# Patient Record
Sex: FEMALE | Race: WHITE | Employment: FULL TIME | URBAN - METROPOLITAN AREA
[De-identification: names, ages, dates, MRNs, and addresses within clinical notes are randomized per-mention and may not be internally consistent; named-entity substitution may affect disease eponyms.]

---

## 2017-02-14 RX ORDER — RISEDRONATE SODIUM 150 MG/1
150 TABLET, FILM COATED ORAL
Qty: 1 TABLET | Refills: 3 | Status: CANCELLED | OUTPATIENT
Start: 2017-02-14

## 2017-02-15 RX ORDER — ALENDRONATE SODIUM 70 MG/1
70 TABLET ORAL
Qty: 12 TABLET | Refills: 3 | Status: SHIPPED | OUTPATIENT
Start: 2017-02-15 | End: 2017-12-30 | Stop reason: SDUPTHER

## 2017-11-06 RX ORDER — SERTRALINE HYDROCHLORIDE 100 MG/1
TABLET, FILM COATED ORAL
Qty: 135 TABLET | Refills: 3 | Status: SHIPPED | OUTPATIENT
Start: 2017-11-06 | End: 2018-06-27 | Stop reason: SDUPTHER

## 2017-11-16 ENCOUNTER — TELEPHONE (OUTPATIENT)
Dept: INTERNAL MEDICINE | Age: 67
End: 2017-11-16

## 2017-11-16 RX ORDER — AMOXICILLIN 500 MG/1
500 CAPSULE ORAL 3 TIMES DAILY
Qty: 21 CAPSULE | Refills: 0 | Status: SHIPPED | OUTPATIENT
Start: 2017-11-16 | End: 2017-11-23

## 2017-11-23 DIAGNOSIS — E78.5 HYPERLIPIDEMIA, UNSPECIFIED HYPERLIPIDEMIA TYPE: ICD-10-CM

## 2017-11-27 RX ORDER — ATORVASTATIN CALCIUM 40 MG/1
TABLET, FILM COATED ORAL
Qty: 90 TABLET | Refills: 0 | Status: SHIPPED | OUTPATIENT
Start: 2017-11-27 | End: 2018-03-27 | Stop reason: SDUPTHER

## 2018-01-02 RX ORDER — ALENDRONATE SODIUM 70 MG/1
TABLET ORAL
Qty: 12 TABLET | Refills: 0 | Status: SHIPPED | OUTPATIENT
Start: 2018-01-02 | End: 2018-03-27 | Stop reason: SDUPTHER

## 2018-01-10 ENCOUNTER — TELEPHONE (OUTPATIENT)
Dept: INTERNAL MEDICINE | Age: 68
End: 2018-01-10

## 2018-01-10 DIAGNOSIS — Z00.00 PHYSICAL EXAM: Primary | ICD-10-CM

## 2018-01-23 ENCOUNTER — TELEPHONE (OUTPATIENT)
Dept: INTERNAL MEDICINE | Age: 68
End: 2018-01-23

## 2018-01-23 RX ORDER — DOXYCYCLINE HYCLATE 100 MG
100 TABLET ORAL 2 TIMES DAILY
Qty: 14 TABLET | Refills: 0 | Status: SHIPPED | OUTPATIENT
Start: 2018-01-23 | End: 2018-01-30

## 2018-01-23 NOTE — TELEPHONE ENCOUNTER
Symptoms: sore throat , coughing a lot , chest and nasal congestion , fatigue x 5 days   What medications have you tried:sudafed and acetaminophen   Aware that Allyson Graves MD arrives @ 1:20 pm today meanwhile message will be sent   Pharmacy:listed   Pt would like a call back to be advised if it is ok to get future labs done due to being sick at the time of this call   Appointment HFNKIDQ:[phone]

## 2018-01-26 ENCOUNTER — TELEPHONE (OUTPATIENT)
Dept: INTERNAL MEDICINE | Age: 68
End: 2018-01-26

## 2018-01-31 ENCOUNTER — OFFICE VISIT (OUTPATIENT)
Dept: INTERNAL MEDICINE | Age: 68
End: 2018-01-31

## 2018-01-31 VITALS
BODY MASS INDEX: 21.49 KG/M2 | WEIGHT: 129 LBS | DIASTOLIC BLOOD PRESSURE: 80 MMHG | SYSTOLIC BLOOD PRESSURE: 110 MMHG | HEIGHT: 65 IN | TEMPERATURE: 98 F

## 2018-01-31 DIAGNOSIS — E55.9 VITAMIN D DEFICIENCY: ICD-10-CM

## 2018-01-31 DIAGNOSIS — M81.0 AGE-RELATED OSTEOPOROSIS WITHOUT CURRENT PATHOLOGICAL FRACTURE: ICD-10-CM

## 2018-01-31 DIAGNOSIS — F32.A DEPRESSION, UNSPECIFIED DEPRESSION TYPE: ICD-10-CM

## 2018-01-31 DIAGNOSIS — Z00.00 WELL ADULT EXAM: Primary | ICD-10-CM

## 2018-01-31 DIAGNOSIS — Z23 NEED FOR INFLUENZA VACCINATION: ICD-10-CM

## 2018-01-31 DIAGNOSIS — L30.9 DERMATITIS: ICD-10-CM

## 2018-01-31 DIAGNOSIS — E78.5 HYPERLIPIDEMIA, UNSPECIFIED HYPERLIPIDEMIA TYPE: ICD-10-CM

## 2018-01-31 PROCEDURE — 90662 IIV NO PRSV INCREASED AG IM: CPT | Performed by: INTERNAL MEDICINE

## 2018-01-31 PROCEDURE — G0008 ADMIN INFLUENZA VIRUS VAC: HCPCS | Performed by: INTERNAL MEDICINE

## 2018-01-31 PROCEDURE — G0402 INITIAL PREVENTIVE EXAM: HCPCS | Performed by: INTERNAL MEDICINE

## 2018-01-31 PROCEDURE — G0403 EKG FOR INITIAL PREVENT EXAM: HCPCS | Performed by: INTERNAL MEDICINE

## 2018-01-31 ASSESSMENT — PATIENT HEALTH QUESTIONNAIRE - PHQ9
SUM OF ALL RESPONSES TO PHQ QUESTIONS 1-9: 0
SUM OF ALL RESPONSES TO PHQ9 QUESTIONS 1 & 2: 0
2. FEELING DOWN, DEPRESSED OR HOPELESS: 0
1. LITTLE INTEREST OR PLEASURE IN DOING THINGS: 0

## 2018-01-31 NOTE — PROGRESS NOTES
pigmentation of the left lower extremity  Lymphatic:  No lymphadenopathy noted   Neurologic:  Alert & oriented x 3, CN 2-12 normal, normal motor function, normal sensory function, no focal deficits noted   Psychiatric:  Speech and behavior appropriate       Vitals: /80   Temp 98 °F (36.7 °C)   Ht 5' 4.5\" (1.638 m)   Wt 129 lb (58.5 kg)   BMI 21.80 kg/m²     Body mass index is 21.8 kg/m². Wt Readings from Last 3 Encounters:   01/31/18 129 lb (58.5 kg)   12/15/16 125 lb (56.7 kg)   09/02/16 128 lb (58.1 kg)         LABS:    CBC:   Lab Results   Component Value Date    WBC 6.3 10/31/2016    HGB 14.1 10/31/2016    HCT 41.9 10/31/2016    MCV 92 10/31/2016     10/31/2016         No results found for: IRON, TIBC, FERRITIN, FOLATE, [id number], PTH                                                          BMP:    Lab Results   Component Value Date     10/31/2016    K 4.6 10/31/2016     10/31/2016    CO2 27 10/31/2016       LFT's:   Lab Results   Component Value Date    ALT 18 10/31/2016    AST 29 10/31/2016    ALKPHOS 96 10/31/2016    BILITOT 0.3 10/31/2016       Lipids:   Lab Results   Component Value Date    CHOL 231 (H) 10/31/2016    HDL 64 10/31/2016    LDLCALC 137 (H) 10/31/2016    TRIG 152 (H) 10/31/2016       INR: No results found for: INR, PROTIME    U/A:  Lab Results   Component Value Date    LABMICR See below: 10/31/2016        No results found for: LABA1C     Lab Results   Component Value Date    CREATININE 0.74 10/31/2016       -----------------------------------------------------------------     Assessment/Plan:   1. Well adult exam  Check screening labs flu shot given she is up-to-date on all her immunizations otherwise continue diet and exercise  - EKG 12 lead  - CBC Auto Differential  - Comprehensive Metabolic Panel  - Lipid Panel  - TSH without Reflex  - Urinalysis  - Vitamin D 25 Hydroxy    2.  Hyperlipidemia, unspecified hyperlipidemia type  Problem is stable check above labs continue present meds  - EKG 12 lead  - CBC Auto Differential  - Comprehensive Metabolic Panel  - Lipid Panel  - TSH without Reflex  - Urinalysis  - Vitamin D 25 Hydroxy    3. Age-related osteoporosis without current pathological fracture  Problem is stable to get a repeat DEXA scan in 1 year    4. Depression, unspecified depression type  Problem is stable on t current meds    5. Vitamin D deficiency  Check above labs  - Vitamin D 25 Hydroxy    6. Dermatitis  Lidex cream if this is not helpful get a dermatology consult for biopsy to rule out squamous cell carcinoma  - fluocinonide (LIDEX) 0.05 % cream; Apply topically 2 times daily. Dispense: 1 Tube; Refill: 1    7.  Need for influenza vaccination  Shot given  - INFLUENZA, HIGH DOSE, 65 YRS +, IM, PF, PREFILL SYR, 0.5ML (FLUZONE HD)

## 2018-02-21 LAB
A/G RATIO: 1.9 (CALC) (ref 1–2.5)
ALBUMIN SERPL-MCNC: 4.5 G/DL (ref 3.6–5.1)
ALP BLD-CCNC: 70 U/L (ref 33–130)
ALT SERPL-CCNC: 22 U/L (ref 6–29)
AMORPHOUS: ABNORMAL /HPF
AST SERPL-CCNC: 24 U/L (ref 10–35)
ATYPICAL LYMPHOCYTE RELATIVE PERCENT: NORMAL % (ref 0–10)
BACTERIA: ABNORMAL /HPF
BAND NEUTROPHILS: NORMAL %
BANDED NEUTROPHILS ABSOLUTE COUNT: NORMAL CELLS/UL (ref 0–750)
BASOPHILS ABSOLUTE: 41 CELLS/UL (ref 0–200)
BASOPHILS RELATIVE PERCENT: 0.7 %
BILIRUB SERPL-MCNC: 0.4 MG/DL (ref 0.2–1.2)
BILIRUBIN URINE: NEGATIVE
BLASTS ABSOLUTE COUNT: NORMAL CELLS/UL
BLASTS RELATIVE PERCENT: NORMAL %
BLOOD, URINE: NEGATIVE
BUN / CREAT RATIO: NORMAL (CALC) (ref 6–22)
BUN BLDV-MCNC: 22 MG/DL (ref 7–25)
CALCIUM OXALATE CRYSTALS: ABNORMAL /HPF
CALCIUM SERPL-MCNC: 9.8 MG/DL (ref 8.6–10.4)
CASTS: ABNORMAL /LPF
CHLORIDE BLD-SCNC: 104 MMOL/L (ref 98–110)
CHOLESTEROL, TOTAL: 172 MG/DL
CHOLESTEROL/HDL RATIO: 3.1 (CALC)
CHOLESTEROL: 116 MG/DL (CALC)
CLARITY: CLEAR
CO2: 28 MMOL/L (ref 20–31)
COLOR: YELLOW
COMMENT: ABNORMAL
COMMENT: NORMAL
CREAT SERPL-MCNC: 0.79 MG/DL (ref 0.5–0.99)
CRYSTALS: ABNORMAL /HPF
EOSINOPHILS ABSOLUTE: 191 CELLS/UL (ref 15–500)
EOSINOPHILS RELATIVE PERCENT: 3.3 %
EPITHELIAL CELLS, UA: ABNORMAL /HPF
GFR AFRICAN AMERICAN: 90 ML/MIN/1.73M2
GFR SERPL CREATININE-BSD FRML MDRD: 78 ML/MIN/1.73M2
GLOBULIN: 2.4 G/DL (CALC) (ref 1.9–3.7)
GLUCOSE BLD-MCNC: 87 MG/DL (ref 65–99)
GLUCOSE URINE: NEGATIVE
GRANULAR CASTS: ABNORMAL /LPF
HCT VFR BLD CALC: 42.5 % (ref 35–45)
HDLC SERPL-MCNC: 56 MG/DL
HEMOGLOBIN: 13.8 G/DL (ref 11.7–15.5)
HYALINE CASTS: ABNORMAL /LPF
KETONES, URINE: NEGATIVE
LDL CHOLESTEROL CALCULATED: 93 MG/DL (CALC)
LEUKOCYTE ESTERASE, URINE: ABNORMAL
LEUKOCYTES, UA: ABNORMAL /HPF
LYMPHOCYTES ABSOLUTE: 2123 CELLS/UL (ref 850–3900)
LYMPHOCYTES RELATIVE PERCENT: 36.6 %
MCH RBC QN AUTO: 30.7 PG (ref 27–33)
MCHC RBC AUTO-ENTMCNC: 32.5 G/DL (ref 32–36)
MCV RBC AUTO: 94.7 FL (ref 80–100)
METAMYELOCYTES ABSOLUTE COUNT: NORMAL CELLS/UL
METAMYELOCYTES RELATIVE PERCENT: NORMAL %
MONOCYTES ABSOLUTE: 458 CELLS/UL (ref 200–950)
MONOCYTES RELATIVE PERCENT: 7.9 %
MYELOCYTE PERCENT: NORMAL %
MYELOCYTES ABSOLUTE COUNT: NORMAL CELLS/UL
NEUTROPHILS ABSOLUTE: 2987 CELLS/UL (ref 1500–7800)
NITRITE, URINE: NEGATIVE
NUCLEATED RED BLOOD CELLS: NORMAL /100 WBC
NUCLEATED RED BLOOD CELLS: NORMAL CELLS/UL
PDW BLD-RTO: 13 % (ref 11–15)
PH UA: 6 (ref 5–8)
PLATELET # BLD: 212 THOUSAND/UL (ref 140–400)
PMV BLD AUTO: 9.7 FL (ref 7.5–12.5)
POTASSIUM SERPL-SCNC: 4.9 MMOL/L (ref 3.5–5.3)
PROMYELOCYTES ABSOLUTE COUNT: NORMAL CELLS/UL
PROMYELOCYTES PERCENT: NORMAL %
PROTEIN UA: NEGATIVE
RBC # BLD: 4.49 MILLION/UL (ref 3.8–5.1)
RBC UA: ABNORMAL /HPF
REDUCING SUBSTANCES, URINE: ABNORMAL %
RENAL EPITHELIAL, POC: ABNORMAL /HPF
SEGMENTED NEUTROPHILS RELATIVE PERCENT: 51.5 %
SODIUM BLD-SCNC: 141 MMOL/L (ref 135–146)
SPECIFIC GRAVITY UA: 1.01 (ref 1–1.03)
TOTAL PROTEIN: 6.9 G/DL (ref 6.1–8.1)
TRANSITIONAL EPITHELIAL: ABNORMAL /HPF
TRIGL SERPL-MCNC: 129 MG/DL
TRIPLE PHOSPHATE CRYSTALS: ABNORMAL /HPF
TSH ULTRASENSITIVE: 3.81 MIU/L (ref 0.4–4.5)
URATE CRYSTALS, URINE: ABNORMAL /HPF
VITAMIN D 25-HYDROXY: 40 NG/ML (ref 30–100)
WBC # BLD: 5.8 THOUSAND/UL (ref 3.8–10.8)
YEAST: ABNORMAL /HPF

## 2018-03-02 ENCOUNTER — TELEPHONE (OUTPATIENT)
Dept: INTERNAL MEDICINE | Age: 68
End: 2018-03-02

## 2018-03-02 NOTE — TELEPHONE ENCOUNTER
Pt is following up on lab work done on 2/20/18. Pt is aware that Dr. Alvin Alaniz is out of office.   Please call

## 2018-03-27 ENCOUNTER — TELEPHONE (OUTPATIENT)
Dept: INTERNAL MEDICINE | Age: 68
End: 2018-03-27

## 2018-03-27 DIAGNOSIS — E78.5 HYPERLIPIDEMIA, UNSPECIFIED HYPERLIPIDEMIA TYPE: ICD-10-CM

## 2018-03-27 RX ORDER — ALENDRONATE SODIUM 70 MG/1
TABLET ORAL
Qty: 12 TABLET | Refills: 3 | Status: SHIPPED | OUTPATIENT
Start: 2018-03-27 | End: 2019-02-16 | Stop reason: SDUPTHER

## 2018-03-27 RX ORDER — ATORVASTATIN CALCIUM 40 MG/1
TABLET, FILM COATED ORAL
Qty: 90 TABLET | Refills: 3 | Status: SHIPPED | OUTPATIENT
Start: 2018-03-27 | End: 2019-03-26 | Stop reason: SDUPTHER

## 2018-06-27 ENCOUNTER — TELEPHONE (OUTPATIENT)
Dept: INTERNAL MEDICINE | Age: 68
End: 2018-06-27

## 2018-06-27 RX ORDER — SERTRALINE HYDROCHLORIDE 100 MG/1
TABLET, FILM COATED ORAL
Qty: 135 TABLET | Refills: 3 | Status: SHIPPED | OUTPATIENT
Start: 2018-06-27 | End: 2019-05-29 | Stop reason: SDUPTHER

## 2019-02-18 RX ORDER — ALENDRONATE SODIUM 70 MG/1
TABLET ORAL
Qty: 12 TABLET | Refills: 0 | Status: SHIPPED | OUTPATIENT
Start: 2019-02-18 | End: 2019-05-20 | Stop reason: SDUPTHER

## 2019-03-26 ENCOUNTER — TELEPHONE (OUTPATIENT)
Dept: INTERNAL MEDICINE CLINIC | Age: 69
End: 2019-03-26

## 2019-03-26 DIAGNOSIS — E78.5 HYPERLIPIDEMIA, UNSPECIFIED HYPERLIPIDEMIA TYPE: ICD-10-CM

## 2019-03-26 RX ORDER — ATORVASTATIN CALCIUM 40 MG/1
TABLET, FILM COATED ORAL
Qty: 90 TABLET | Refills: 3 | Status: SHIPPED | OUTPATIENT
Start: 2019-03-26 | End: 2019-05-29 | Stop reason: SDUPTHER

## 2019-05-08 ENCOUNTER — TELEPHONE (OUTPATIENT)
Dept: INTERNAL MEDICINE CLINIC | Age: 69
End: 2019-05-08

## 2019-05-08 DIAGNOSIS — E78.2 MIXED HYPERLIPIDEMIA: Primary | ICD-10-CM

## 2019-05-08 DIAGNOSIS — Z00.00 PHYSICAL EXAM, ANNUAL: ICD-10-CM

## 2019-05-20 RX ORDER — ALENDRONATE SODIUM 70 MG/1
TABLET ORAL
Qty: 12 TABLET | Refills: 0 | Status: SHIPPED | OUTPATIENT
Start: 2019-05-20 | End: 2019-05-29 | Stop reason: SDUPTHER

## 2019-05-29 ENCOUNTER — OFFICE VISIT (OUTPATIENT)
Dept: INTERNAL MEDICINE CLINIC | Age: 69
End: 2019-05-29
Payer: MEDICARE

## 2019-05-29 VITALS
DIASTOLIC BLOOD PRESSURE: 68 MMHG | WEIGHT: 123 LBS | BODY MASS INDEX: 21 KG/M2 | SYSTOLIC BLOOD PRESSURE: 122 MMHG | HEIGHT: 64 IN

## 2019-05-29 DIAGNOSIS — Z00.00 ANNUAL PHYSICAL EXAM: ICD-10-CM

## 2019-05-29 DIAGNOSIS — E78.5 HYPERLIPIDEMIA, UNSPECIFIED HYPERLIPIDEMIA TYPE: ICD-10-CM

## 2019-05-29 DIAGNOSIS — M81.0 OSTEOPOROSIS, UNSPECIFIED OSTEOPOROSIS TYPE, UNSPECIFIED PATHOLOGICAL FRACTURE PRESENCE: ICD-10-CM

## 2019-05-29 DIAGNOSIS — Z12.11 SCREENING FOR COLON CANCER: ICD-10-CM

## 2019-05-29 DIAGNOSIS — Z23 NEED FOR 23-POLYVALENT PNEUMOCOCCAL POLYSACCHARIDE VACCINE: Primary | ICD-10-CM

## 2019-05-29 PROCEDURE — 90732 PPSV23 VACC 2 YRS+ SUBQ/IM: CPT | Performed by: INTERNAL MEDICINE

## 2019-05-29 PROCEDURE — 3017F COLORECTAL CA SCREEN DOC REV: CPT | Performed by: INTERNAL MEDICINE

## 2019-05-29 PROCEDURE — G0009 ADMIN PNEUMOCOCCAL VACCINE: HCPCS | Performed by: INTERNAL MEDICINE

## 2019-05-29 PROCEDURE — G0439 PPPS, SUBSEQ VISIT: HCPCS | Performed by: INTERNAL MEDICINE

## 2019-05-29 PROCEDURE — 4040F PNEUMOC VAC/ADMIN/RCVD: CPT | Performed by: INTERNAL MEDICINE

## 2019-05-29 PROCEDURE — 93000 ELECTROCARDIOGRAM COMPLETE: CPT | Performed by: INTERNAL MEDICINE

## 2019-05-29 PROCEDURE — 1123F ACP DISCUSS/DSCN MKR DOCD: CPT | Performed by: INTERNAL MEDICINE

## 2019-05-29 RX ORDER — SERTRALINE HYDROCHLORIDE 100 MG/1
TABLET, FILM COATED ORAL
Qty: 135 TABLET | Refills: 3 | Status: SHIPPED | OUTPATIENT
Start: 2019-05-29 | End: 2020-05-14

## 2019-05-29 RX ORDER — ATORVASTATIN CALCIUM 40 MG/1
TABLET, FILM COATED ORAL
Qty: 90 TABLET | Refills: 3 | Status: SHIPPED | OUTPATIENT
Start: 2019-05-29 | End: 2020-09-02

## 2019-05-29 RX ORDER — ALENDRONATE SODIUM 70 MG/1
TABLET ORAL
Qty: 12 TABLET | Refills: 3 | Status: SHIPPED | OUTPATIENT
Start: 2019-05-29 | End: 2020-07-27

## 2019-05-29 ASSESSMENT — LIFESTYLE VARIABLES
HOW OFTEN DURING THE LAST YEAR HAVE YOU HAD A FEELING OF GUILT OR REMORSE AFTER DRINKING: 0
AUDIT-C TOTAL SCORE: 1
HOW OFTEN DO YOU HAVE A DRINK CONTAINING ALCOHOL: 1
HAVE YOU OR SOMEONE ELSE BEEN INJURED AS A RESULT OF YOUR DRINKING: 0
HOW OFTEN DO YOU HAVE SIX OR MORE DRINKS ON ONE OCCASION: 0
HOW OFTEN DURING THE LAST YEAR HAVE YOU NEEDED AN ALCOHOLIC DRINK FIRST THING IN THE MORNING TO GET YOURSELF GOING AFTER A NIGHT OF HEAVY DRINKING: 0
HOW OFTEN DURING THE LAST YEAR HAVE YOU BEEN UNABLE TO REMEMBER WHAT HAPPENED THE NIGHT BEFORE BECAUSE YOU HAD BEEN DRINKING: 0
HOW OFTEN DURING THE LAST YEAR HAVE YOU FOUND THAT YOU WERE NOT ABLE TO STOP DRINKING ONCE YOU HAD STARTED: 0
AUDIT TOTAL SCORE: 1
HOW MANY STANDARD DRINKS CONTAINING ALCOHOL DO YOU HAVE ON A TYPICAL DAY: 0
HAS A RELATIVE, FRIEND, DOCTOR, OR ANOTHER HEALTH PROFESSIONAL EXPRESSED CONCERN ABOUT YOUR DRINKING OR SUGGESTED YOU CUT DOWN: 0
HOW OFTEN DURING THE LAST YEAR HAVE YOU FAILED TO DO WHAT WAS NORMALLY EXPECTED FROM YOU BECAUSE OF DRINKING: 0

## 2019-05-29 ASSESSMENT — PATIENT HEALTH QUESTIONNAIRE - PHQ9
SUM OF ALL RESPONSES TO PHQ QUESTIONS 1-9: 0
SUM OF ALL RESPONSES TO PHQ QUESTIONS 1-9: 0

## 2019-05-29 ASSESSMENT — ANXIETY QUESTIONNAIRES: GAD7 TOTAL SCORE: 0

## 2019-05-29 NOTE — PROGRESS NOTES
Medicare Annual Wellness Visit  Name: Kar Mo Date: 2019   MRN: [id number] Sex: Female   Age: 80 y.o. Ethnicity: Non-/Non    : 1950 Race: Alberto Kaufman is here for Medicare AWV    Screenings for behavioral, psychosocial and functional/safety risks, and cognitive dysfunction are all negative except as indicated below. These results, as well as other patient data from the [address] form, are documented in Flowsheets linked to this Encounter. Allergies   Allergen Reactions    Sulfa Antibiotics Rash     Patient developed a rash and muscle pain. Prior to Visit Medications    Medication Sig Taking? Authorizing Provider   alendronate (FOSAMAX) 70 MG tablet TAKE 1 TABLET EVERY 7 DAYS Yes Bernard Velez MD   atorvastatin (LIPITOR) 40 MG tablet TAKE 1 TABLET DAILY Yes Bernard Velez MD   sertraline (ZOLOFT) 100 MG tablet TAKE ONE AND ONE-HALF TABLETS DAILY Yes Bernard Velez MD   fluocinonide (LIDEX) 0.05 % cream Apply topically 2 times daily. Yes Bernard Velez MD   cycloSPORINE (RESTASIS) 0.05 % ophthalmic emulsion 1 drop 2 times daily Yes Historical Provider, MD   Tretinoin (RETIN-A EX) Apply  topically. Yes Historical Provider, MD     Past Medical History:   Diagnosis Date    Age-related osteoporosis without current pathological fracture 2018    Depression     Hyperlipidemia      No past surgical history on file.   Family History   Problem Relation Age of Onset    Alzheimer's Disease Mother     Heart Disease Mother     Depression Mother     Cancer Father        CareTeam (Including outside providers/suppliers regularly involved in providing care):   Patient Care Team:  Bernard Velez MD as PCP - General (Internal Medicine)    Wt Readings from Last 3 Encounters:   19 123 lb (55.8 kg)   18 129 lb (58.5 kg)   12/15/16 125 lb (56.7 kg)     Vitals:    19 1524   BP: 122/68   Site: Left Upper Arm   Weight: 123 lb (55.8 kg)   Height: 5' 4\" (1.626 m)     Body mass index is 21.11 kg/m². Based upon direct observation of the patient, evaluation of cognition reveals recent and remote memory intact. General Appearance: alert and oriented to person, place and time, well developed and well- nourished, in no acute distress  Skin: warm and dry, no rash or erythema  Head: normocephalic and atraumatic  Eyes: pupils equal, round, and reactive to light, extraocular eye movements intact, conjunctivae normal  ENT: tympanic membrane, external ear and ear canal normal bilaterally, nose without deformity, nasal mucosa and turbinates normal without polyps  Neck: supple and non-tender without mass, no thyromegaly or thyroid nodules, no cervical lymphadenopathy  Pulmonary/Chest: clear to auscultation bilaterally- no wheezes, rales or rhonchi, normal air movement, no respiratory distress  Cardiovascular: normal rate, regular rhythm, normal S1 and S2, no murmurs, rubs, clicks, or gallops, distal pulses intact, no carotid bruits  Abdomen: soft, non-tender, non-distended, normal bowel sounds, no masses or organomegaly  Extremities: no cyanosis, clubbing or edema  Musculoskeletal: normal range of motion, no joint swelling, deformity or tenderness  Neurologic: reflexes normal and symmetric, no cranial nerve deficit, gait, coordination and speech normal    Patient's complete Health Risk Assessment and screening values have been reviewed and are found in Flowsheets. The following problems were reviewed today and where indicated follow up appointments were made and/or referrals ordered. Positive Risk Factor Screenings with Interventions:     General Health:  General  In general, how would you say your health is?: Very Good  In the past 7 days, have you experienced any of the following?  New or Increased Pain, New or Increased Fatigue, Loneliness, Social Isolation, Stress or Anger?: None of These  Do you get the social and emotional support that you need?: Yes  Do you have a Living Will?: (!) No  General Health Risk Interventions:  · Poor self-assessment of health status: patient declines any further evaluation/treatment for this issue    Personalized Preventive Plan   Current Health Maintenance Status  Immunization History   Administered Date(s) Administered    Influenza Vaccine, unspecified formulation 10/30/2017    Influenza, High Dose (Fluzone 65 yrs and older) 12/15/2016, 2018    Influenza, Intradermal, Preservative free 11/15/2013    Pneumococcal 13-valent Conjugate (Xpfeonn30) 12/15/2016    Tdap (Boostrix, Adacel) 2009    Zoster Live (Zostavax) 2015        Health Maintenance   Topic Date Due    Hepatitis C screen  1950    Shingles Vaccine (2 of 3) 2015    Pneumococcal 65+ years Vaccine (2 of 2 - PPSV23) 12/15/2017    Colon cancer screen colonoscopy  2018    DTaP/Tdap/Td vaccine (2 - Td) 2019    Flu vaccine (Season Ended) 2019    Breast cancer screen  2020    Lipid screen  2024    DEXA (modify frequency per FRAX score)  Completed     Recommendations for Preventive Services Due: see orders and patient instructions/AVS.  . Recommended screening schedule for the next 5-10 years is provided to the patient in written form: see Patient Instructions/AVS.        Annual Wellness Visit     Patient:  Cori Jack                                               : 1950  Age: 80 y.o. MRN: [id number]  Date : 2019      CHIEF COMPLAINT: Cori Jack is a 80 y.o. female who presents for : Physical exam    1. Screening for colon cancer  Date colonoscopy next year  - COLONOSCOPY (Screening); Future    2. Hyperlipidemia, unspecified hyperlipidemia type  No complaints no myalgias  - EKG 12 Lead  - atorvastatin (LIPITOR) 40 MG tablet; TAKE 1 TABLET DAILY  Dispense: 90 tablet; Refill: 3    3.  Osteoporosis, unspecified osteoporosis type, unspecified pathological fracture presence  To get a repeat DEXA scan continue on Fosamax  - DEXA BONE DENSITY 2 SITES; Future  Physical exam Gen. he feels okay denies any chest pain shortness of breath or any other problems      Patient Active Problem List    Diagnosis Date Noted    Age-related osteoporosis without current pathological fracture 01/31/2018    Stress at home 09/16/2016    Depression 05/19/2011    Hyperlipidemia 05/19/2011    Vitamin D deficiency 05/19/2011       Constitutional:  Denies fever or chills   Eyes:  Denies change in visual acuity   HENT:  Denies nasal congestion or sore throat   Respiratory:  Denies cough or shortness of breath   Cardiovascular:  Denies chest pain or edema   GI:  Denies abdominal pain, nausea, vomiting, bloody stools or diarrhea   :  Denies dysuria   Musculoskeletal:  Denies back pain or joint pain   Integument:  Denies rash   Neurologic:  Denies headache, focal weakness or sensory changes   Endocrine:  Denies polyuria or polydipsia   Lymphatic:  Denies swollen glands   Psychiatric:  Denies depression or anxiety     Past Medical History:        Diagnosis Date    Age-related osteoporosis without current pathological fracture 1/31/2018    Depression     Hyperlipidemia        Past Surgical History:    No past surgical history on file.     Family History:  Family History   Problem Relation Age of Onset    Alzheimer's Disease Mother     Heart Disease Mother     Depression Mother     Cancer Father        Social History:  Social History     Socioeconomic History    Marital status:      Spouse name: None    Number of children: None    Years of education: None    Highest education level: None   Occupational History    None   Social Needs    Financial resource strain: None    Food insecurity:     Worry: None     Inability: None    Transportation needs:     Medical: None     Non-medical: None   Tobacco Use    Smoking status: Never Smoker    Smokeless tobacco: Never Used   Substance and Sexual Activity    Alcohol use: No    Drug use: None    Sexual activity: None   Lifestyle    Physical activity:     Days per week: None     Minutes per session: None    Stress: None   Relationships    Social connections:     Talks on phone: None     Gets together: None     Attends Jew service: None     Active member of club or organization: None     Attends meetings of clubs or organizations: None     Relationship status: None    Intimate partner violence:     Fear of current or ex partner: None     Emotionally abused: None     Physically abused: None     Forced sexual activity: None   Other Topics Concern    None   Social History Narrative    None         Allergies:  Sulfa antibiotics    Current Medications:    Prior to Admission medications    Medication Sig Start Date End Date Taking? Authorizing Provider   alendronate (FOSAMAX) 70 MG tablet TAKE 1 TABLET EVERY 7 DAYS 5/29/19  Yes Jewell Oneill MD   atorvastatin (LIPITOR) 40 MG tablet TAKE 1 TABLET DAILY 5/29/19  Yes Jewell Oneill MD   sertraline (ZOLOFT) 100 MG tablet TAKE ONE AND ONE-HALF TABLETS DAILY 5/29/19  Yes Jewell Oneill MD   fluocinonide (LIDEX) 0.05 % cream Apply topically 2 times daily. 1/31/18  Yes Jewell Oneill MD   cycloSPORINE (RESTASIS) 0.05 % ophthalmic emulsion 1 drop 2 times daily   Yes Historical Provider, MD   Tretinoin (RETIN-A EX) Apply  topically. Yes Historical Provider, MD           Physical Exam:      Constitutional:  Well developed, well nourished, no acute distress, non-toxic appearance   Eyes:  PERRL, conjunctiva normal   HENT:  Atraumatic, external ears normal, nose normal, oropharynx moist, no pharyngeal exudates.  Neck- normal range of motion, no tenderness, supple   Respiratory:  No respiratory distress, normal breath sounds, no rales, no wheezing   Cardiovascular:  Normal rate, normal rhythm, no murmurs, no gallops, no rubs   GI:  Soft, nondistended, normal bowel sounds, nontender, no organomegaly, no mass, no rebound, no guarding   :  No costovertebral angle tenderness   Musculoskeletal:  No edema, no tenderness, no deformities. Back- no tenderness  Integument:  Well hydrated, no rash   Lymphatic:  No lymphadenopathy noted   Neurologic:  Alert & oriented x 3, CN 2-12 normal, normal motor function, normal sensory function, no focal deficits noted   Psychiatric:  Speech and behavior appropriate       Vitals: /68 (Site: Left Upper Arm)   Ht 5' 4\" (1.626 m)   Wt 123 lb (55.8 kg)   BMI 21.11 kg/m²     Body mass index is 21.11 kg/m². Wt Readings from Last 3 Encounters:   05/29/19 123 lb (55.8 kg)   01/31/18 129 lb (58.5 kg)   12/15/16 125 lb (56.7 kg)         LABS:    CBC:   Lab Results   Component Value Date    WBC 4.7 05/21/2019    HGB 13.7 05/21/2019    HCT 41.4 05/21/2019    MCV 94.1 05/21/2019     05/21/2019         No results found for: IRON, TIBC, FERRITIN, FOLATE, [id number], PTH                                                          BMP:    Lab Results   Component Value Date     05/21/2019    K 4.8 05/21/2019     05/21/2019    CO2 31 05/21/2019       LFT's:   Lab Results   Component Value Date    ALT 16 05/21/2019    AST 25 05/21/2019    ALKPHOS 61 05/21/2019    BILITOT 0.4 05/21/2019       Lipids:   Lab Results   Component Value Date    CHOL 164 05/21/2019    CHOL 112 05/21/2019    HDL 52 05/21/2019    LDLCALC 89 05/21/2019    TRIG 132 05/21/2019       INR: No results found for: INR, PROTIME    U/A:  Lab Results   Component Value Date    LABMICR See below: 10/31/2016        No results found for: LABA1C     Lab Results   Component Value Date    CREATININE 0.82 05/21/2019       -----------------------------------------------------------------     Assessment/Plan:   1. Screening for colon cancer  To get colonoscopy in one year  - COLONOSCOPY (Screening); Future    2.  Hyperlipidemia, unspecified hyperlipidemia type  His problem is stable check above labs continue present meds  - EKG 12 Lead  - atorvastatin (LIPITOR) 40

## 2019-07-09 ENCOUNTER — TELEPHONE (OUTPATIENT)
Dept: INTERNAL MEDICINE CLINIC | Age: 69
End: 2019-07-09

## 2020-05-14 RX ORDER — SERTRALINE HYDROCHLORIDE 100 MG/1
TABLET, FILM COATED ORAL
Qty: 135 TABLET | Refills: 3 | Status: SHIPPED | OUTPATIENT
Start: 2020-05-14 | End: 2021-05-17

## 2020-07-22 ENCOUNTER — TELEPHONE (OUTPATIENT)
Dept: INTERNAL MEDICINE CLINIC | Age: 70
End: 2020-07-22

## 2020-07-27 RX ORDER — ALENDRONATE SODIUM 70 MG/1
TABLET ORAL
Qty: 12 TABLET | Refills: 0 | Status: SHIPPED | OUTPATIENT
Start: 2020-07-27 | End: 2020-10-08

## 2020-08-17 RX ORDER — ATORVASTATIN CALCIUM 40 MG/1
TABLET, FILM COATED ORAL
Qty: 90 TABLET | Refills: 3 | OUTPATIENT
Start: 2020-08-17

## 2020-09-02 RX ORDER — ATORVASTATIN CALCIUM 40 MG/1
TABLET, FILM COATED ORAL
Qty: 30 TABLET | Refills: 0 | Status: SHIPPED | OUTPATIENT
Start: 2020-09-02 | End: 2020-09-23

## 2020-09-10 ENCOUNTER — OFFICE VISIT (OUTPATIENT)
Dept: INTERNAL MEDICINE CLINIC | Age: 70
End: 2020-09-10
Payer: MEDICARE

## 2020-09-10 VITALS — TEMPERATURE: 98 F | BODY MASS INDEX: 21 KG/M2 | WEIGHT: 123 LBS | HEIGHT: 64 IN

## 2020-09-10 PROBLEM — D12.6 ADENOMATOUS POLYP OF COLON: Status: ACTIVE | Noted: 2020-09-10

## 2020-09-10 PROCEDURE — 3017F COLORECTAL CA SCREEN DOC REV: CPT | Performed by: INTERNAL MEDICINE

## 2020-09-10 PROCEDURE — 1123F ACP DISCUSS/DSCN MKR DOCD: CPT | Performed by: INTERNAL MEDICINE

## 2020-09-10 PROCEDURE — 90694 VACC AIIV4 NO PRSRV 0.5ML IM: CPT | Performed by: INTERNAL MEDICINE

## 2020-09-10 PROCEDURE — 93000 ELECTROCARDIOGRAM COMPLETE: CPT | Performed by: INTERNAL MEDICINE

## 2020-09-10 PROCEDURE — G0008 ADMIN INFLUENZA VIRUS VAC: HCPCS | Performed by: INTERNAL MEDICINE

## 2020-09-10 PROCEDURE — 4040F PNEUMOC VAC/ADMIN/RCVD: CPT | Performed by: INTERNAL MEDICINE

## 2020-09-10 PROCEDURE — G0438 PPPS, INITIAL VISIT: HCPCS | Performed by: INTERNAL MEDICINE

## 2020-09-10 ASSESSMENT — PATIENT HEALTH QUESTIONNAIRE - PHQ9
SUM OF ALL RESPONSES TO PHQ9 QUESTIONS 1 & 2: 1
SUM OF ALL RESPONSES TO PHQ QUESTIONS 1-9: 1
SUM OF ALL RESPONSES TO PHQ QUESTIONS 1-9: 1
1. LITTLE INTEREST OR PLEASURE IN DOING THINGS: 1
2. FEELING DOWN, DEPRESSED OR HOPELESS: 0

## 2020-09-10 ASSESSMENT — LIFESTYLE VARIABLES: HOW OFTEN DO YOU HAVE A DRINK CONTAINING ALCOHOL: 0

## 2020-09-10 NOTE — PROGRESS NOTES
Annual Wellness Visit     Patient:  Kameron Berry                                               : 1950  Age: 77 y.o. MRN: <[id number]>  Date : 9/10/2020      CHIEF COMPLAINT: Kameron Berry is a 77 y.o. female who presents for : Physical exam    1. Wellness examination  Generally feels good denies any chest pain shortness of breath or any other problems  - EKG 12 Lead  - External Referral To Gastroenterology    2. Hyperlipidemia, unspecified hyperlipidemia type  No complaints no myalgias  - EKG 12 Lead    3. Adenomatous polyp of colon, unspecified part of colon  Has a history of colon polyps is due for colonoscopy    4. Osteoporosis, unspecified osteoporosis type, unspecified pathological fracture presence  Been on Fosamax for 3 years  - VITAMIN D 25 HYDROXY; Future    5.  Need for influenza vaccination    - INFLUENZA, QUADV, ADJUVANTED, 72 YRS =, IM, PF, PREFILL SYR, 0.5ML (FLUAD)        Patient Active Problem List    Diagnosis Date Noted    Adenomatous polyp of colon 09/10/2020    Age-related osteoporosis without current pathological fracture 2018    Stress at home 2016    Depression 2011    Hyperlipidemia 2011    Vitamin D deficiency 2011       Constitutional:  Denies fever or chills   Eyes:  Denies change in visual acuity   HENT:  Denies nasal congestion or sore throat   Respiratory:  Denies cough or shortness of breath   Cardiovascular:  Denies chest pain or edema   GI:  Denies abdominal pain, nausea, vomiting, bloody stools or diarrhea   :  Denies dysuria   Musculoskeletal:  Denies back pain or joint pain   Integument:  Denies rash   Neurologic:  Denies headache, focal weakness or sensory changes   Endocrine:  Denies polyuria or polydipsia   Lymphatic:  Denies swollen glands   Psychiatric:  Denies depression or anxiety     Past Medical History:        Diagnosis Date    Adenomatous polyp of colon 9/10/2020    Age-related osteoporosis without current Component Value Date    CHOL 213 (H) 09/05/2020    HDL 67 09/05/2020    LDLCALC 121 (H) 09/05/2020    TRIG 146 09/05/2020       INR: No results found for: INR, PROTIME    U/A:  Lab Results   Component Value Date    LABMICR See below: 09/05/2020    LABMICR CANCELED 09/05/2020        No results found for: LABA1C     Lab Results   Component Value Date    CREATININE 0.77 09/05/2020       -----------------------------------------------------------------     Assessment/Plan:   1. Wellness examination  Check screening labs flu shot given continue diet and exercise  - EKG 12 Lead  - External Referral To Gastroenterology    2. Hyperlipidemia, unspecified hyperlipidemia type  This problem is stable check above labs continue present meds  - EKG 12 Lead    3. Adenomatous polyp of colon, unspecified part of colon  Referral to \"GI for colonoscopy    4. Osteoporosis, unspecified osteoporosis type, unspecified pathological fracture presence  Check vitamin D level continue present meds will reassess after 5 years  - VITAMIN D 25 HYDROXY; Future    5.  Need for influenza vaccination  Flu shot given  - INFLUENZA, QUADV, ADJUVANTED, 65 YRS =, IM, PF, PREFILL SYR, 0.5ML (FLUAD)

## 2020-09-10 NOTE — PROGRESS NOTES
Medicare Annual Wellness Visit  Name: Wendy Adjutant Date: 9/10/2020   MRN: <[id number]> Sex: Female   Age: 77 y.o. Ethnicity: Non-/Non    : 1950 Race: Olamide Cruz is here for Medicare AWV    Screenings for behavioral, psychosocial and functional/safety risks, and cognitive dysfunction are all negative except as indicated below. These results, as well as other patient data from the [address] [facility] form, are documented in Flowsheets linked to this Encounter. Allergies   Allergen Reactions    Sulfa Antibiotics Rash     Patient developed a rash and muscle pain. Prior to Visit Medications    Medication Sig Taking? Authorizing Provider   atorvastatin (LIPITOR) 40 MG tablet TAKE 1 TABLET DAILY Yes Cristobal Mcfarland MD   alendronate (FOSAMAX) 70 MG tablet TAKE 1 TABLET EVERY 7 DAYS Yes Cristobal Mcfarland MD   sertraline (ZOLOFT) 100 MG tablet TAKE 1 AND 1/2 TABLETS     DAILY Yes Cristobal Mcfarland MD   cycloSPORINE (RESTASIS) 0.05 % ophthalmic emulsion 1 drop 2 times daily Yes Historical Provider, MD   Tretinoin (RETIN-A EX) Apply  topically. Yes Historical Provider, MD       Past Medical History:   Diagnosis Date    Adenomatous polyp of colon 9/10/2020    Age-related osteoporosis without current pathological fracture 2018    Depression     Hyperlipidemia        No past surgical history on file.     Family History   Problem Relation Age of Onset    Alzheimer's Disease Mother     Heart Disease Mother     Depression Mother     Cancer Father        CareTeam (Including outside providers/suppliers regularly involved in providing care):   Patient Care Team:  Cristobal Mcfarland MD as PCP - General (Internal Medicine)  Cristobal Mcfarland MD as PCP - REHABILITATION [facility] Empaneled Provider    Wt Readings from Last 3 Encounters:   09/10/20 123 lb (55.8 kg)   19 123 lb (55.8 kg)   18 129 lb (58.5 kg)     Vitals:    09/10/20 1331   Temp: 98 °F (36.7 °C)   Weight: 123 lb (55.8 kg)   Height: 5' 4\" (1.626 m)     Body mass index is 21.11 kg/m². Based upon direct observation of the patient, evaluation of cognition reveals recent and remote memory intact. General Appearance: alert and oriented to person, place and time, well developed and well- nourished, in no acute distress  Skin: warm and dry, no rash or erythema  Head: normocephalic and atraumatic  Eyes: pupils equal, round, and reactive to light, extraocular eye movements intact, conjunctivae normal  ENT: tympanic membrane, external ear and ear canal normal bilaterally, nose without deformity, nasal mucosa and turbinates normal without polyps  Neck: supple and non-tender without mass, no thyromegaly or thyroid nodules, no cervical lymphadenopathy  Pulmonary/Chest: clear to auscultation bilaterally- no wheezes, rales or rhonchi, normal air movement, no respiratory distress  Cardiovascular: normal rate, regular rhythm, normal S1 and S2, no murmurs, rubs, clicks, or gallops, distal pulses intact, no carotid bruits  Abdomen: soft, non-tender, non-distended, normal bowel sounds, no masses or organomegaly  Extremities: no cyanosis, clubbing or edema  Musculoskeletal: normal range of motion, no joint swelling, deformity or tenderness  Neurologic: reflexes normal and symmetric, no cranial nerve deficit, gait, coordination and speech normal    Patient's complete Health Risk Assessment and screening values have been reviewed and are found in Flowsheets. The following problems were reviewed today and where indicated follow up appointments were made and/or referrals ordered. Positive Risk Factor Screenings with Interventions:     No Positive Risk Factors identified today.     Personalized Preventive Plan   Current Health Maintenance Status  Immunization History   Administered Date(s) Administered    Influenza Vaccine, unspecified formulation 10/30/2017    Influenza, High Dose (Fluzone 65 yrs and older) 12/15/2016, 01/31/2018    Influenza, Intradermal, Preservative free 11/15/2013    Pneumococcal Conjugate 13-valent ([id number]) 12/15/2016    Pneumococcal Polysaccharide (Uithsyqxn16) 05/29/2019    Tdap (Boostrix, Adacel) 06/05/2009    Zoster Live (Zostavax) 04/21/2015        Health Maintenance   Topic Date Due    Hepatitis C screen  1950    Shingles Vaccine (2 of 3) 06/16/2015    Colon cancer screen colonoscopy  01/24/2018    Annual Wellness Visit (AWV)  05/29/2019    DTaP/Tdap/Td vaccine (2 - Td) 06/05/2019    Flu vaccine (1) 09/01/2020    Breast cancer screen  06/19/2021    Lipid screen  09/05/2021    DEXA (modify frequency per FRAX score)  Completed    Pneumococcal 65+ years Vaccine  Completed    Hepatitis A vaccine  Aged Out    Hepatitis B vaccine  Aged Out    Hib vaccine  Aged Out    Meningococcal (ACWY) vaccine  Aged Out     Recommendations for [place] Due: see orders and patient instructions/AVS.  . Recommended screening schedule for the next 5-10 years is provided to the patient in written form: see Patient Kurt Centeno was seen today for medicare awv.     Diagnoses and all orders for this visit:    Wellness examination  -     EKG 12 Lead    Hyperlipidemia, unspecified hyperlipidemia type  -     EKG 12 Lead    Adenomatous polyp of colon, unspecified part of colon

## 2020-09-23 RX ORDER — ATORVASTATIN CALCIUM 40 MG/1
TABLET, FILM COATED ORAL
Qty: 30 TABLET | Refills: 5 | Status: SHIPPED | OUTPATIENT
Start: 2020-09-23 | End: 2021-03-23

## 2020-10-08 ENCOUNTER — TELEPHONE (OUTPATIENT)
Dept: INTERNAL MEDICINE CLINIC | Age: 70
End: 2020-10-08

## 2020-10-08 RX ORDER — AMOXICILLIN 500 MG/1
500 CAPSULE ORAL 3 TIMES DAILY
Qty: 21 CAPSULE | Refills: 0 | Status: SHIPPED | OUTPATIENT
Start: 2020-10-08 | End: 2020-10-09 | Stop reason: SDUPTHER

## 2020-10-08 RX ORDER — ALENDRONATE SODIUM 70 MG/1
TABLET ORAL
Qty: 12 TABLET | Refills: 0 | Status: SHIPPED | OUTPATIENT
Start: 2020-10-08 | End: 2020-10-09 | Stop reason: SDUPTHER

## 2020-10-08 NOTE — TELEPHONE ENCOUNTER
Sounds like a virus. Is mucus yellow or green? Has it been going on for 10 days? Patient states mucus is turning yellow. Amoxicillin 500 mg tid x 7 days    Script sent.

## 2020-10-09 RX ORDER — AMOXICILLIN 500 MG/1
500 CAPSULE ORAL 3 TIMES DAILY
Qty: 21 CAPSULE | Refills: 0 | Status: SHIPPED | OUTPATIENT
Start: 2020-10-09 | End: 2020-10-16

## 2020-10-09 RX ORDER — ALENDRONATE SODIUM 70 MG/1
TABLET ORAL
Qty: 12 TABLET | Refills: 0 | Status: SHIPPED | OUTPATIENT
Start: 2020-10-09 | End: 2021-03-23

## 2020-12-11 ENCOUNTER — OFFICE VISIT (OUTPATIENT)
Dept: INTERNAL MEDICINE CLINIC | Age: 70
End: 2020-12-11
Payer: MEDICARE

## 2020-12-11 VITALS — SYSTOLIC BLOOD PRESSURE: 100 MMHG | DIASTOLIC BLOOD PRESSURE: 58 MMHG

## 2020-12-11 PROCEDURE — 4040F PNEUMOC VAC/ADMIN/RCVD: CPT | Performed by: INTERNAL MEDICINE

## 2020-12-11 PROCEDURE — G8420 CALC BMI NORM PARAMETERS: HCPCS | Performed by: INTERNAL MEDICINE

## 2020-12-11 PROCEDURE — 3017F COLORECTAL CA SCREEN DOC REV: CPT | Performed by: INTERNAL MEDICINE

## 2020-12-11 PROCEDURE — G8399 PT W/DXA RESULTS DOCUMENT: HCPCS | Performed by: INTERNAL MEDICINE

## 2020-12-11 PROCEDURE — 93000 ELECTROCARDIOGRAM COMPLETE: CPT | Performed by: INTERNAL MEDICINE

## 2020-12-11 PROCEDURE — 1123F ACP DISCUSS/DSCN MKR DOCD: CPT | Performed by: INTERNAL MEDICINE

## 2020-12-11 PROCEDURE — 1090F PRES/ABSN URINE INCON ASSESS: CPT | Performed by: INTERNAL MEDICINE

## 2020-12-11 PROCEDURE — G8484 FLU IMMUNIZE NO ADMIN: HCPCS | Performed by: INTERNAL MEDICINE

## 2020-12-11 PROCEDURE — 1036F TOBACCO NON-USER: CPT | Performed by: INTERNAL MEDICINE

## 2020-12-11 PROCEDURE — G8427 DOCREV CUR MEDS BY ELIG CLIN: HCPCS | Performed by: INTERNAL MEDICINE

## 2020-12-11 PROCEDURE — 99213 OFFICE O/P EST LOW 20 MIN: CPT | Performed by: INTERNAL MEDICINE

## 2020-12-11 RX ORDER — DOXYCYCLINE HYCLATE 100 MG/1
100 CAPSULE ORAL DAILY
COMMUNITY
End: 2021-12-03 | Stop reason: CLARIF

## 2020-12-11 NOTE — PROGRESS NOTES
CHIEF COMPLAINT: Kwadwo Bhatia is a 72 y.o. female who presents for palpitations    HPI: Patient presented with rotations these occur at rest is been under a lot of stress she does not take her pulse denies any chest pain or shortness of breath    Review of Systems:   Constitutional:  Denies fever or chills   Eyes:  Denies change in visual acuity   HENT:  Denies nasal congestion or sore throat   Respiratory:  Denies cough or shortness of breath   Cardiovascular:  Denies chest pain or edema   GI:  Denies abdominal pain, nausea, vomiting, bloody stools or diarrhea   :  Denies dysuria   Musculoskeletal:  Denies back pain or joint pain   Integument:  Denies rash   Neurologic:  Denies headache, focal weakness or sensory changes   Endocrine:  Denies polyuria or polydipsia   Lymphatic:  Denies swollen glands   Psychiatric:  Denies depression or anxiety     Past Medical History:        Diagnosis Date    Adenomatous polyp of colon 9/10/2020    Age-related osteoporosis without current pathological fracture 1/31/2018    Depression     Hyperlipidemia        Past Surgical History:    No past surgical history on file.     Family History:  Family History   Problem Relation Age of Onset    Alzheimer's Disease Mother     Heart Disease Mother     Depression Mother     Cancer Father        Social History:  Social History     Socioeconomic History    Marital status:      Spouse name: None    Number of children: None    Years of education: None    Highest education level: None   Occupational History    None   Social Needs    Financial resource strain: None    Food insecurity     Worry: None     Inability: None    Transportation needs     Medical: None     Non-medical: None   Tobacco Use    Smoking status: Never Smoker    Smokeless tobacco: Never Used   Substance and Sexual Activity    Alcohol use: No    Drug use: None    Sexual activity: None   Lifestyle    Physical activity     Days per week: None Minutes per session: None    Stress: None   Relationships    Social connections     Talks on phone: None     Gets together: None     Attends Rastafarian service: None     Active member of club or organization: None     Attends meetings of clubs or organizations: None     Relationship status: None    Intimate partner violence     Fear of current or ex partner: None     Emotionally abused: None     Physically abused: None     Forced sexual activity: None   Other Topics Concern    None   Social History Narrative    None         Allergies:  Sulfa antibiotics    Current Medications:    Prior to Admission medications    Medication Sig Start Date End Date Taking? Authorizing Provider   doxycycline hyclate (VIBRAMYCIN) 100 MG capsule Take 100 mg by mouth daily   Yes Historical Provider, MD   alendronate (FOSAMAX) 70 MG tablet TAKE 1 TABLET EVERY 7 DAYS 10/9/20  Yes Grant Bay MD   atorvastatin (LIPITOR) 40 MG tablet TAKE 1 TABLET DAILY 9/23/20  Yes Grant Bay MD   sertraline (ZOLOFT) 100 MG tablet TAKE 1 AND 1/2 TABLETS     DAILY 5/14/20  Yes Grant Bay MD   cycloSPORINE (RESTASIS) 0.05 % ophthalmic emulsion 1 drop 2 times daily   Yes Historical Provider, MD   Tretinoin (RETIN-A EX) Apply  topically. Yes Historical Provider, MD       Physical Exam:  Vital Signs: BP (!) 100/58   General: Patient appears  non-toxic  HENT: Atraumatic, normocephalic, oral mucosa moist  Lungs:  Clear bilaterally  Heart: Regular rate and rhythm  Abdomen: Non-distended, soft, non-tender  Extremities: No edema  Neuro: Nonfocal    Medical Decision Making and Plan:  Pertinent Labs & Imaging studies reviewed. (See chart for details)  EKG is normal    1.  Palpitations  Probably anxiety induced will get a ZIO 2-week monitor and analyzed from there  - EKG 12 Lead  - Longterm Continuous Cardiac Event Monitor

## 2020-12-14 ENCOUNTER — TELEPHONE (OUTPATIENT)
Dept: INTERNAL MEDICINE CLINIC | Age: 70
End: 2020-12-14

## 2020-12-14 NOTE — TELEPHONE ENCOUNTER
Patient is calling to speak to Dr. Dakotah Carrion about getting her long term cardiology event monitor on 12/29/2020 and the patient is due to fly out of town on the 12/22/2020. The office that is giving the heart monitor that she shouldn't fly with the device. Please call and advise.

## 2020-12-23 ENCOUNTER — TELEPHONE (OUTPATIENT)
Dept: INTERNAL MEDICINE CLINIC | Age: 70
End: 2020-12-23

## 2020-12-23 NOTE — TELEPHONE ENCOUNTER
Micah Castle with [facility] needs a question answered about a diagnosis code for pt. She said they recently received a letter fixing it but still has a questions.   [phone]

## 2020-12-29 ENCOUNTER — NURSE ONLY (OUTPATIENT)
Dept: CARDIOLOGY CLINIC | Age: 70
End: 2020-12-29

## 2020-12-29 PROCEDURE — 0296T PR EXT ECG > 48HR TO 21 DAY RCRD W/CONECT INTL RCRD: CPT | Performed by: INTERNAL MEDICINE

## 2021-01-25 PROCEDURE — 93244 EXT ECG>48HR<7D REV&INTERPJ: CPT | Performed by: INTERNAL MEDICINE

## 2021-01-28 DIAGNOSIS — R00.2 PALPITATION: ICD-10-CM

## 2021-02-01 ENCOUNTER — TELEPHONE (OUTPATIENT)
Dept: INTERNAL MEDICINE CLINIC | Age: 71
End: 2021-02-01

## 2021-02-02 ENCOUNTER — TELEPHONE (OUTPATIENT)
Dept: CARDIOLOGY CLINIC | Age: 71
End: 2021-02-02

## 2021-02-02 NOTE — TELEPHONE ENCOUNTER
The final report for the [place] monitor has been read and scanned under the media tab. Thank you for your referral. Please feel free to contact our office with any questions at  [phone].

## 2021-02-03 NOTE — TELEPHONE ENCOUNTER
Metoprolol XL 25 mg nightly. If still symptomatic. Nothing significant, couple of episodes of fast rate but not the serious kind. Call returned, patient notified of results. Does not want medication at this time but may call back. She is less symptomatic not noticing much at this time.

## 2021-03-23 DIAGNOSIS — E78.5 HYPERLIPIDEMIA, UNSPECIFIED HYPERLIPIDEMIA TYPE: ICD-10-CM

## 2021-03-23 RX ORDER — ALENDRONATE SODIUM 70 MG/1
TABLET ORAL
Qty: 12 TABLET | Refills: 5 | Status: SHIPPED | OUTPATIENT
Start: 2021-03-23 | End: 2022-03-07

## 2021-03-23 RX ORDER — ATORVASTATIN CALCIUM 40 MG/1
TABLET, FILM COATED ORAL
Qty: 30 TABLET | Refills: 5 | Status: SHIPPED | OUTPATIENT
Start: 2021-03-23 | End: 2021-08-19

## 2021-05-17 RX ORDER — SERTRALINE HYDROCHLORIDE 100 MG/1
TABLET, FILM COATED ORAL
Qty: 135 TABLET | Refills: 3 | Status: SHIPPED | OUTPATIENT
Start: 2021-05-17 | End: 2022-05-16

## 2021-06-11 ENCOUNTER — OFFICE VISIT (OUTPATIENT)
Dept: INTERNAL MEDICINE CLINIC | Age: 71
End: 2021-06-11
Payer: MEDICARE

## 2021-06-11 VITALS
WEIGHT: 126 LBS | HEART RATE: 82 BPM | BODY MASS INDEX: 21.51 KG/M2 | SYSTOLIC BLOOD PRESSURE: 100 MMHG | HEIGHT: 64 IN | DIASTOLIC BLOOD PRESSURE: 67 MMHG

## 2021-06-11 DIAGNOSIS — M81.0 OSTEOPOROSIS, UNSPECIFIED OSTEOPOROSIS TYPE, UNSPECIFIED PATHOLOGICAL FRACTURE PRESENCE: ICD-10-CM

## 2021-06-11 DIAGNOSIS — Z48.89 SUTURE CHECK: Primary | ICD-10-CM

## 2021-06-11 PROCEDURE — 4040F PNEUMOC VAC/ADMIN/RCVD: CPT | Performed by: INTERNAL MEDICINE

## 2021-06-11 PROCEDURE — 1090F PRES/ABSN URINE INCON ASSESS: CPT | Performed by: INTERNAL MEDICINE

## 2021-06-11 PROCEDURE — G8427 DOCREV CUR MEDS BY ELIG CLIN: HCPCS | Performed by: INTERNAL MEDICINE

## 2021-06-11 PROCEDURE — 3017F COLORECTAL CA SCREEN DOC REV: CPT | Performed by: INTERNAL MEDICINE

## 2021-06-11 PROCEDURE — 1123F ACP DISCUSS/DSCN MKR DOCD: CPT | Performed by: INTERNAL MEDICINE

## 2021-06-11 PROCEDURE — G8420 CALC BMI NORM PARAMETERS: HCPCS | Performed by: INTERNAL MEDICINE

## 2021-06-11 PROCEDURE — G8399 PT W/DXA RESULTS DOCUMENT: HCPCS | Performed by: INTERNAL MEDICINE

## 2021-06-11 PROCEDURE — 99213 OFFICE O/P EST LOW 20 MIN: CPT | Performed by: INTERNAL MEDICINE

## 2021-06-11 PROCEDURE — 1036F TOBACCO NON-USER: CPT | Performed by: INTERNAL MEDICINE

## 2021-06-11 SDOH — ECONOMIC STABILITY: FOOD INSECURITY: WITHIN THE PAST 12 MONTHS, THE FOOD YOU BOUGHT JUST DIDN'T LAST AND YOU DIDN'T HAVE MONEY TO GET MORE.: NEVER TRUE

## 2021-06-11 SDOH — ECONOMIC STABILITY: FOOD INSECURITY: WITHIN THE PAST 12 MONTHS, YOU WORRIED THAT YOUR FOOD WOULD RUN OUT BEFORE YOU GOT MONEY TO BUY MORE.: NEVER TRUE

## 2021-06-11 ASSESSMENT — SOCIAL DETERMINANTS OF HEALTH (SDOH): HOW HARD IS IT FOR YOU TO PAY FOR THE VERY BASICS LIKE FOOD, HOUSING, MEDICAL CARE, AND HEATING?: NOT HARD AT ALL

## 2021-06-11 NOTE — PROGRESS NOTES
(Zostavax) 04/21/2015    Zoster Recombinant (Shingrix) 09/10/2019       Review of Systems  A 10-organ Review Of Systems was obtained and otherwise unremarkable except as per HPI. Data: Old records have been reviewed electronically. PHYSICAL EXAM:  /67   Pulse 82   Ht 5' 4\" (1.626 m)   Wt 126 lb (57.2 kg)   BMI 21.63 kg/m²   Physical Exam  Constitutional:       Appearance: Normal appearance. Cardiovascular:      Rate and Rhythm: Normal rate and regular rhythm. Pulses: Normal pulses. Heart sounds: Normal heart sounds. Pulmonary:      Effort: Pulmonary effort is normal.      Breath sounds: Normal breath sounds. Abdominal:      General: Bowel sounds are normal.      Palpations: Abdomen is soft. Musculoskeletal:      Cervical back: Normal range of motion and neck supple. Skin:     General: Skin is warm and dry. Capillary Refill: Capillary refill takes less than 2 seconds. Comments: 2 stitches on 1st digit of left hand   Neurological:      General: No focal deficit present. Mental Status: She is alert and oriented to person, place, and time. Mental status is at baseline. Assessment & Plan:      1. Suture check  No erythema, tenderness, edema or warmth at suture sites. Sutures were removed, antibiotic ointment applied and bandaged. Patient was instructed to keep site dry and clean. No follow-ups on file.     Dispo: Pt has been staffed with Dr. Ravindra Tadeo MD.  _______________  Sadaf Rollins MD, 6/11/2021 2:43 PM   PGY-1

## 2021-08-18 DIAGNOSIS — E78.5 HYPERLIPIDEMIA, UNSPECIFIED HYPERLIPIDEMIA TYPE: ICD-10-CM

## 2021-08-19 RX ORDER — ATORVASTATIN CALCIUM 40 MG/1
TABLET, FILM COATED ORAL
Qty: 30 TABLET | Refills: 5 | Status: SHIPPED | OUTPATIENT
Start: 2021-08-19 | End: 2021-12-08 | Stop reason: SDUPTHER

## 2021-09-07 ENCOUNTER — TELEPHONE (OUTPATIENT)
Dept: INTERNAL MEDICINE CLINIC | Age: 71
End: 2021-09-07

## 2021-09-07 NOTE — TELEPHONE ENCOUNTER
DEXA BONE DENSITY AXIAL SKELETON  Status: Final result   Order Providers    Authorizing Encounter   MD Sukhdeep Mejia MD          All Reviewers List    [facility] 09[facility]49     DEXA BONE DENSITY AXIAL SKELETON: Result Notes     [facility]   9/7/2021  9:49 AM EDT       [phone] (home)   Patient has been notified of results. Kisha Mcdermott MD   8/31/2021  5:09 PM EDT       Osteoporosis continue forsomax  Repeat in years          Routing History    Priority Sent On From To Message Type    8/31/2021  5:09 PM Sukhdeep Sierra MD P [facility] [place] [facility] [place] [id number] Practice Support Result Notes    8/31/2021  4:25 PM [place], Rad Results In Sukhdeep Sierra MD Results   Radiation Dose Estimates    No radiation information found for this patient   Narrative   Indication: The patient is presently being monitored while on    treatment and requires a bone density assessment.            Study was performed on Awareness Card 5.               Bone Density:   -----------------------------------------------------------------   Region                        BMD         T-score       Z-score          -----------------------------------------------------------------   AP Spine (L1-L4)              0.765        -2. 6          -0.5            Femoral Neck (Right)          0.548        -2.7          -0.9            Total Hip (Right)             0.694        -2.0          -0.5            -----------------------------------------------------------------       World Health Organization criteria for BMD interpretation    classify patients as:    Normal (T-score at or above -1.0),    Low Bone Density (T-score between -1.0 and -2.5), or    Osteoporotic (T-score at or below -2.5).     T Scores are reported in Postmenopausal women and in men age 48    and older.     Z-scores are reported in females prior to menopause and in    males younger than age 48.        10-year Fracture Risk: -----------------------------------------------------------------   FRAX not reported because:     Some T-score for Spine Total or Hip Total or Femoral Neck at    or below -2.5      Treated for osteoporosis   -----------------------------------------------------------------               Previous Exams:   -----------------------------------------------------------------   Region           Date      Age    BMD**  T-score    BMD Change**    -----------------------------------------------------------------   AP Spine(L1-L4)                  08/30/2021    79    0.765    -2. 6        -0.4%                           07/03/2019    68    0.768    -2.5                             Total Hip(Right)                  08/30/2021    70    0.694    -2.0        -0.9%                           07/03/2019    68    0.700    -2.0         2.1%                           01/30/2017    66    0.686    -2.1                             -----------------------------------------------------------------   *Denotes significance at 95% confidence level, LSC for AP Spine    = 0.032g/cm2, LSC for Total Hip = 0.024 g/cm2, LSC for Distal    1/3 Radius = 0.026 g/cm2, site specific LSC for AP Spine =    0.032 g/cm2,  site specific LSC for Total Hip = 0.022 g/cm2       **BMD is shown in g/cm2 and BMD Change indicates change vs    previous BMD            Clinical Information Provided by Patient:   -----------------------------------------------------------------   Has had a low trauma fracture. Is being treated for osteoporosis. Has taken a bisphosphonate within the last two years. Has used or is currently using the following medications:    Calcium, Vitamin D, Fosamax    Has had or currently has the following medical conditions:     Anorexia or Bulimia, Vitamin D Insufficiency, Depression    Patient maximum height was 77   Menopause Age: 47   Patient is Post-menopausal.   Has low body mass.

## 2021-09-07 NOTE — TELEPHONE ENCOUNTER
Patient wants to know the comparison? Slightly worse in spine. Call returned. Message has been left for the patient.

## 2021-10-21 ENCOUNTER — NURSE ONLY (OUTPATIENT)
Dept: INTERNAL MEDICINE CLINIC | Age: 71
End: 2021-10-21
Payer: MEDICARE

## 2021-10-21 DIAGNOSIS — Z23 NEED FOR IMMUNIZATION AGAINST INFLUENZA: Primary | ICD-10-CM

## 2021-10-21 PROCEDURE — G0008 ADMIN INFLUENZA VIRUS VAC: HCPCS | Performed by: INTERNAL MEDICINE

## 2021-10-21 PROCEDURE — 90694 VACC AIIV4 NO PRSRV 0.5ML IM: CPT | Performed by: INTERNAL MEDICINE

## 2021-11-08 ENCOUNTER — TELEPHONE (OUTPATIENT)
Dept: INTERNAL MEDICINE CLINIC | Age: 71
End: 2021-11-08

## 2021-11-08 DIAGNOSIS — R00.2 PALPITATIONS: ICD-10-CM

## 2021-11-08 DIAGNOSIS — E78.5 HYPERLIPIDEMIA, UNSPECIFIED HYPERLIPIDEMIA TYPE: Primary | ICD-10-CM

## 2021-11-08 DIAGNOSIS — E55.9 VITAMIN D DEFICIENCY: ICD-10-CM

## 2021-11-08 NOTE — TELEPHONE ENCOUNTER
----- Message from Davis Riosragini sent at 11/8/2021 10:07 AM EST -----  Subject: Message to Provider    QUESTIONS  Information for Provider? PT is needing blood work from office faxed over   to home, if able to email please send to [email or web address]  ---------------------------------------------------------------------------  --------------  CALL BACK INFO  What is the best way for the office to contact you? OK to leave message on   voicemail  Preferred Call Back Phone Number? [phone]  ---------------------------------------------------------------------------  --------------  SCRIPT ANSWERS  Relationship to Patient?  Self

## 2021-12-03 ENCOUNTER — OFFICE VISIT (OUTPATIENT)
Dept: INTERNAL MEDICINE CLINIC | Age: 71
End: 2021-12-03
Payer: MEDICARE

## 2021-12-03 VITALS
HEIGHT: 64 IN | SYSTOLIC BLOOD PRESSURE: 101 MMHG | WEIGHT: 125 LBS | BODY MASS INDEX: 21.34 KG/M2 | HEART RATE: 77 BPM | DIASTOLIC BLOOD PRESSURE: 70 MMHG

## 2021-12-03 DIAGNOSIS — M48.02 SPINAL STENOSIS OF CERVICAL REGION: ICD-10-CM

## 2021-12-03 DIAGNOSIS — F32.A DEPRESSION, UNSPECIFIED DEPRESSION TYPE: ICD-10-CM

## 2021-12-03 DIAGNOSIS — E55.9 VITAMIN D DEFICIENCY: ICD-10-CM

## 2021-12-03 DIAGNOSIS — M81.0 AGE-RELATED OSTEOPOROSIS WITHOUT CURRENT PATHOLOGICAL FRACTURE: ICD-10-CM

## 2021-12-03 DIAGNOSIS — D12.6 ADENOMATOUS POLYP OF COLON, UNSPECIFIED PART OF COLON: ICD-10-CM

## 2021-12-03 DIAGNOSIS — E78.5 HYPERLIPIDEMIA, UNSPECIFIED HYPERLIPIDEMIA TYPE: ICD-10-CM

## 2021-12-03 DIAGNOSIS — Z00.00 PE (PHYSICAL EXAM), ANNUAL: Primary | ICD-10-CM

## 2021-12-03 DIAGNOSIS — F43.9 STRESS AT HOME: ICD-10-CM

## 2021-12-03 PROCEDURE — G8484 FLU IMMUNIZE NO ADMIN: HCPCS | Performed by: INTERNAL MEDICINE

## 2021-12-03 PROCEDURE — 3017F COLORECTAL CA SCREEN DOC REV: CPT | Performed by: INTERNAL MEDICINE

## 2021-12-03 PROCEDURE — G0439 PPPS, SUBSEQ VISIT: HCPCS | Performed by: INTERNAL MEDICINE

## 2021-12-03 PROCEDURE — 4040F PNEUMOC VAC/ADMIN/RCVD: CPT | Performed by: INTERNAL MEDICINE

## 2021-12-03 PROCEDURE — 1123F ACP DISCUSS/DSCN MKR DOCD: CPT | Performed by: INTERNAL MEDICINE

## 2021-12-03 RX ORDER — MINOCYCLINE HYDROCHLORIDE 100 MG/1
100 CAPSULE ORAL 2 TIMES DAILY
COMMUNITY

## 2021-12-03 NOTE — PROGRESS NOTES
Annual Wellness Visit     Patient:  Cindy Dalton                                               : 1950  Age: 80 y.o. MRN: <[id number]>  Date : 12/3/2021      CHIEF COMPLAINT: Cindy Dalton is a 80 y.o. female who presents for : Physical exam    1. Age-related osteoporosis without current pathological fracture  Problem is stable on present meds    2. Adenomatous polyp of colon, unspecified part of colon  History of colon polyps up-to-date on her colonoscopy    3. Stress at home  Problem has reduced somewhat    4. Hyperlipidemia, unspecified hyperlipidemia type  No complaints no myalgias  - LIPID PANEL; Future    5. Vitamin D deficiency      6. Depression, unspecified depression type  Problem is stable    7. Spinal stenosis of cervical region  Had some problems with her neck and has been diagnosed as having spinal stenosis is scheduled for physical therapy    8.  PE (physical exam), annual  Generally feels good does have some sinus congestion and postnasal drip no chest pain or shortness of breath        Patient Active Problem List    Diagnosis Date Noted    Spinal stenosis of cervical region 2021    Adenomatous polyp of colon 09/10/2020    Age-related osteoporosis without current pathological fracture 2018    Stress at home 2016    Depression 2011    Hyperlipidemia 2011    Vitamin D deficiency 2011       Constitutional:  Denies fever or chills   Eyes:  Denies change in visual acuity   HENT:  Denies nasal congestion or sore throat   Respiratory:  Denies cough or shortness of breath   Cardiovascular:  Denies chest pain or edema   GI:  Denies abdominal pain, nausea, vomiting, bloody stools or diarrhea   :  Denies dysuria   Musculoskeletal:  Denies back pain or joint pain   Integument:  Denies rash   Neurologic:  Denies headache, focal weakness or sensory changes   Endocrine:  Denies polyuria or polydipsia   Lymphatic:  Denies swollen glands   Psychiatric:  Denies depression or anxiety     Past Medical History:        Diagnosis Date    Adenomatous polyp of colon 9/10/2020    Age-related osteoporosis without current pathological fracture 1/31/2018    Depression     Hyperlipidemia     Spinal stenosis of cervical region 12/3/2021       Past Surgical History:    No past surgical history on file. Family History:  Family History   Problem Relation Age of Onset    Alzheimer's Disease Mother     Heart Disease Mother     Depression Mother     Cancer Father        Social History:  Social History     Socioeconomic History    Marital status:      Spouse name: None    Number of children: None    Years of education: None    Highest education level: None   Occupational History    None   Tobacco Use    Smoking status: Never Smoker    Smokeless tobacco: Never Used   Substance and Sexual Activity    Alcohol use: No    Drug use: None    Sexual activity: None   Other Topics Concern    None   Social History Narrative    None     Social Determinants of Health     Financial Resource Strain: Low Risk     Difficulty of Paying Living Expenses: Not hard at all   Food Insecurity: No Food Insecurity    Worried About Running Out of Food in the Last Year: Never true    Dann of Food in the Last Year: Never true   Transportation Needs:     Lack of Transportation (Medical): Not on file    Lack of Transportation (Non-Medical):  Not on file   Physical Activity:     Days of Exercise per Week: Not on file    Minutes of Exercise per Session: Not on file   Stress:     Feeling of Stress : Not on file   Social Connections:     Frequency of Communication with Friends and Family: Not on file    Frequency of Social Gatherings with Friends and Family: Not on file    Attends Bahai Services: Not on file    Active Member of Clubs or Organizations: Not on file    Attends Club or Organization Meetings: Not on file    Marital Status: Not on file   Intimate Partner Violence:  Fear of Current or Ex-Partner: Not on file    Emotionally Abused: Not on file    Physically Abused: Not on file    Sexually Abused: Not on file   Housing Stability:     Unable to Pay for Housing in the Last Year: Not on file    Number of Places Lived in the Last Year: Not on file    Unstable Housing in the Last Year: Not on file         Allergies:  Sulfa antibiotics    Current Medications:    Prior to Admission medications    Medication Sig Start Date End Date Taking? Authorizing Provider   minocycline (MINOCIN;DYNACIN) 100 MG capsule Take 100 mg by mouth 2 times daily   Yes Historical Provider, MD   Calcium Citrate-Vitamin D (CITRACAL + D PO) Take by mouth   Yes Historical Provider, MD   atorvastatin (LIPITOR) 40 MG tablet TAKE 1 TABLET DAILY 8/19/21  Yes Gely Berrios MD   sertraline (ZOLOFT) 100 MG tablet TAKE 1 AND 1/2 TABLETS     DAILY 5/17/21  Yes Gely Berrios MD   alendronate (FOSAMAX) 70 MG tablet TAKE 1 TABLET EVERY 7 DAYS 3/23/21  Yes Gely Berrios MD   cycloSPORINE (RESTASIS) 0.05 % ophthalmic emulsion 1 drop 2 times daily   Yes Historical Provider, MD   Tretinoin (RETIN-A EX) Apply  topically. Yes Historical Provider, MD           Physical Exam:      Constitutional:  Well developed, well nourished, no acute distress, non-toxic appearance   Eyes:  PERRL, conjunctiva normal   HENT:  Atraumatic, external ears normal, nose normal, oropharynx moist, no pharyngeal exudates. Neck- normal range of motion, no tenderness, supple theme of the nares with nasal polyp on the right  Respiratory:  No respiratory distress, normal breath sounds, no rales, no wheezing   Cardiovascular:  Normal rate, normal rhythm, no murmurs, no gallops, no rubs   GI:  Soft, nondistended, normal bowel sounds, nontender, no organomegaly, no mass, no rebound, no guarding   :  No costovertebral angle tenderness   Musculoskeletal:  No edema, no tenderness, no deformities.  Back- no tenderness  Integument:  Well hydrated, no rash Lymphatic:  No lymphadenopathy noted   Neurologic:  Alert & oriented x 3, CN 2-12 normal, normal motor function, normal sensory function, no focal deficits noted   Psychiatric:  Speech and behavior appropriate       Vitals: /70   Pulse 77   Ht 5' 4\" (1.626 m)   Wt 125 lb (56.7 kg)   BMI 21.46 kg/m²     Body mass index is 21.46 kg/m². Wt Readings from Last 3 Encounters:   12/03/21 125 lb (56.7 kg)   06/11/21 126 lb (57.2 kg)   09/10/20 123 lb (55.8 kg)         LABS:    CBC:   Lab Results   Component Value Date    WBC 9.9 11/29/2021    HGB 13.9 11/29/2021    HCT 44.6 11/29/2021    MCV 99.6 11/29/2021     11/29/2021         No results found for: IRON, TIBC, FERRITIN, FOLATE, HBBVOACV35, PTH                                                          BMP:    Lab Results   Component Value Date     11/29/2021    K 4.5 11/29/2021     11/29/2021    CO2 32 (H) 11/29/2021       LFT's:   Lab Results   Component Value Date    ALT 21 11/29/2021    AST 23 11/29/2021    ALKPHOS 91 11/29/2021    BILITOT 0.3 11/29/2021       Lipids:   Lab Results   Component Value Date    CHOL 213 (H) 09/05/2020    HDL 67 09/05/2020    LDLCALC 121 (H) 09/05/2020    TRIG 146 09/05/2020       INR: No results found for: INR, PROTIME    U/A:  Lab Results   Component Value Date    LABMICR See below: 09/05/2020    LABMICR CANCELED 09/05/2020        No results found for: LABA1C     Lab Results   Component Value Date    CREATININE 0.79 11/29/2021       -----------------------------------------------------------------     Assessment/Plan:   1. Age-related osteoporosis without current pathological fracture  Problem is stable continue present meds    2. Adenomatous polyp of colon, unspecified part of colon  Problem is stable had a colonoscopy 1 year ago that was negative    3. Stress at home  Problem is under control    4.  Hyperlipidemia, unspecified hyperlipidemia type  This problem is stable check above labs continue present meds  - LIPID PANEL; Future    5. Vitamin D deficiency  Check above labs    6. Depression, unspecified depression type  Problem stable with present meds    7. Spinal stenosis of cervical region  Get physical therapy    8.  PE (physical exam), annual  Screening labs she is up-to-date on all her immunizations continue diet and exercise

## 2021-12-03 NOTE — PROGRESS NOTES
Medicare Annual Wellness Visit  Name: Leti Pack Date: 12/3/2021   MRN: <[id number]> Sex: Female   Age: 80 y.o. Ethnicity: Non- / Non    : 1950 Race: White (non-)      Eliot Stuart is here for Medicare AWV    Screenings for behavioral, psychosocial and functional/safety risks, and cognitive dysfunction are all negative except as indicated below. These results, as well as other patient data from the [address] form, are documented in Flowsheets linked to this Encounter. Allergies   Allergen Reactions    Sulfa Antibiotics Rash     Patient developed a rash and muscle pain. Prior to Visit Medications    Medication Sig Taking? Authorizing Provider   minocycline (MINOCIN;DYNACIN) 100 MG capsule Take 100 mg by mouth 2 times daily Yes Historical Provider, MD   Calcium Citrate-Vitamin D (CITRACAL + D PO) Take by mouth Yes Historical Provider, MD   atorvastatin (LIPITOR) 40 MG tablet TAKE 1 TABLET DAILY Yes Chalo Morgan MD   sertraline (ZOLOFT) 100 MG tablet TAKE 1 AND 1/2 TABLETS     DAILY Yes Chalo Morgan MD   alendronate (FOSAMAX) 70 MG tablet TAKE 1 TABLET EVERY 7 DAYS Yes Chalo Morgan MD   cycloSPORINE (RESTASIS) 0.05 % ophthalmic emulsion 1 drop 2 times daily Yes Historical Provider, MD   Tretinoin (RETIN-A EX) Apply  topically. Yes Historical Provider, MD       Past Medical History:   Diagnosis Date    Adenomatous polyp of colon 9/10/2020    Age-related osteoporosis without current pathological fracture 2018    Depression     Hyperlipidemia        No past surgical history on file.     Family History   Problem Relation Age of Onset    Alzheimer's Disease Mother     Heart Disease Mother     Depression Mother     Cancer Father        CareTeam (Including outside providers/suppliers regularly involved in providing care):   Patient Care Team:  Chalo Morgan MD as PCP - General (Internal Medicine)  Chalo Morgan MD as PCP - [facility] Provider    Wt Readings from Last 3 Encounters:   12/03/21 125 lb (56.7 kg)   06/11/21 126 lb (57.2 kg)   09/10/20 123 lb (55.8 kg)     Vitals:    12/03/21 1437   BP: 101/70   Pulse: 77   Weight: 125 lb (56.7 kg)   Height: 5' 4\" (1.626 m)     Body mass index is 21.46 kg/m². Based upon direct observation of the patient, evaluation of cognition reveals recent and remote memory intact. General Appearance: alert and oriented to person, place and time, well developed and well- nourished, in no acute distress  Skin: warm and dry, no rash or erythema  Head: normocephalic and atraumatic  Eyes: pupils equal, round, and reactive to light, extraocular eye movements intact, conjunctivae normal  ENT: tympanic membrane, external ear and ear canal normal bilaterally, nose without deformity, nasal mucosa and turbinates normal without polyps  Neck: supple and non-tender without mass, no thyromegaly or thyroid nodules, no cervical lymphadenopathy  Pulmonary/Chest: clear to auscultation bilaterally- no wheezes, rales or rhonchi, normal air movement, no respiratory distress  Cardiovascular: normal rate, regular rhythm, normal S1 and S2, no murmurs, rubs, clicks, or gallops, distal pulses intact, no carotid bruits  Abdomen: soft, non-tender, non-distended, normal bowel sounds, no masses or organomegaly  Extremities: no cyanosis, clubbing or edema  Musculoskeletal: normal range of motion, no joint swelling, deformity or tenderness  Neurologic: reflexes normal and symmetric, no cranial nerve deficit, gait, coordination and speech normal    Patient's complete Health Risk Assessment and screening values have been reviewed and are found in Flowsheets. The following problems were reviewed today and where indicated follow up appointments were made and/or referrals ordered.     Positive Risk Factor Screenings with Interventions:          General Health and ACP:     Advance Directives     Power of  Living Will ACP-Advance Directive ACP-Power of     Not on File Not on File Not on File Not on File      General Health Risk Interventions:  · Poor self-assessment of health status: patient declines any further evaluation/treatment for this issue        Personalized Preventive Plan   Current Health Maintenance Status  Immunization History   Administered Date(s) Administered    COVID-19, Oliver Pitts, ELVER, 30mcg/0.3mL 03/26/2021, 04/21/2021    Influenza Vaccine, unspecified formulation 10/30/2017    Influenza, High Dose (Fluzone 65 yrs and older) 12/15/2016, 01/31/2018    Influenza, Intradermal, Preservative free 11/15/2013    Influenza, Quadv, adjuvanted, 65 yrs +, IM, PF (Fluad) 09/10/2020, 10/21/2021    Pneumococcal Conjugate 13-valent ([id number]) 12/15/2016    Pneumococcal Polysaccharide ([id number]) 05/29/2019    Tdap (Boostrix, Adacel) 06/05/2009    Zoster Live (Zostavax) 04/21/2015    Zoster Recombinant (Shingrix) 09/10/2019        Health Maintenance   Topic Date Due    Hepatitis C screen  Never done    Colon cancer screen colonoscopy  01/24/2018    Shingles Vaccine (3 of 3) 11/05/2019    Lipid screen  09/05/2021    Annual Wellness Visit (AWV)  09/11/2021    COVID-19 Vaccine (3 - Booster for Pfizer series) 10/21/2021    Breast cancer screen  10/28/2022    DTaP/Tdap/Td vaccine (3 - Td or Tdap) 06/04/2031    DEXA (modify frequency per FRAX score)  Completed    Flu vaccine  Completed    Pneumococcal 65+ years Vaccine  Completed    Hepatitis A vaccine  Aged Out    Hepatitis B vaccine  Aged Out    Hib vaccine  Aged Out    Meningococcal (ACWY) vaccine  Aged Out     Recommendations for PLAXD Due: see orders and patient instructions/AVS.  . Recommended screening schedule for the next 5-10 years is provided to the patient in written form: see Patient Instructions/AVS.    There are no diagnoses linked to this encounter.

## 2021-12-08 DIAGNOSIS — E78.5 HYPERLIPIDEMIA, UNSPECIFIED HYPERLIPIDEMIA TYPE: ICD-10-CM

## 2021-12-08 RX ORDER — ATORVASTATIN CALCIUM 40 MG/1
40 TABLET, FILM COATED ORAL DAILY
Qty: 90 TABLET | Refills: 1 | Status: SHIPPED | OUTPATIENT
Start: 2021-12-08 | End: 2022-06-09

## 2021-12-08 NOTE — TELEPHONE ENCOUNTER
----- Message from [place] sent at 12/7/2021  5:47 PM EST -----  Subject: Refill Request    QUESTIONS  Name of Medication? atorvastatin (LIPITOR) 40 MG tablet  Patient-reported dosage and instructions? one tablet daily  How many days do you have left? 8  Preferred Pharmacy? [facility] phone number (if available)? [phone]  Additional Information for Provider? Pt is requesting 90 day supply. ---------------------------------------------------------------------------  --------------  Court STEVENSON  What is the best way for the office to contact you? OK to leave message on   voicemail  Preferred Call Back Phone Number?  [phone]

## 2022-03-07 RX ORDER — ALENDRONATE SODIUM 70 MG/1
TABLET ORAL
Qty: 12 TABLET | Refills: 3 | Status: SHIPPED | OUTPATIENT
Start: 2022-03-07

## 2022-05-16 RX ORDER — SERTRALINE HYDROCHLORIDE 100 MG/1
TABLET, FILM COATED ORAL
Qty: 135 TABLET | Refills: 3 | Status: SHIPPED | OUTPATIENT
Start: 2022-05-16

## 2022-06-08 DIAGNOSIS — E78.5 HYPERLIPIDEMIA, UNSPECIFIED HYPERLIPIDEMIA TYPE: ICD-10-CM

## 2022-06-09 RX ORDER — ATORVASTATIN CALCIUM 40 MG/1
TABLET, FILM COATED ORAL
Qty: 90 TABLET | Refills: 1 | Status: SHIPPED | OUTPATIENT
Start: 2022-06-09

## 2022-10-24 ENCOUNTER — TELEPHONE (OUTPATIENT)
Dept: INTERNAL MEDICINE CLINIC | Age: 72
End: 2022-10-24

## 2022-10-24 NOTE — TELEPHONE ENCOUNTER
Pt would like labs before her awv 12-12-22 so she an discuss them with Doctor at her appt       Please advise

## 2022-10-25 NOTE — TELEPHONE ENCOUNTER
I left a message for the patient to discuss his blood work for his appointment on 12/7 at his 11/11 appointment.

## 2022-11-08 DIAGNOSIS — E78.5 HYPERLIPIDEMIA, UNSPECIFIED HYPERLIPIDEMIA TYPE: ICD-10-CM

## 2022-11-08 RX ORDER — ATORVASTATIN CALCIUM 40 MG/1
TABLET, FILM COATED ORAL
Qty: 90 TABLET | Refills: 1 | Status: SHIPPED | OUTPATIENT
Start: 2022-11-08

## 2022-11-11 ENCOUNTER — NURSE ONLY (OUTPATIENT)
Dept: INTERNAL MEDICINE CLINIC | Age: 72
End: 2022-11-11
Payer: MEDICARE

## 2022-11-11 DIAGNOSIS — Z23 NEED FOR INFLUENZA VACCINATION: Primary | ICD-10-CM

## 2022-11-11 PROCEDURE — 90694 VACC AIIV4 NO PRSRV 0.5ML IM: CPT | Performed by: INTERNAL MEDICINE

## 2022-11-11 PROCEDURE — G0008 ADMIN INFLUENZA VIRUS VAC: HCPCS | Performed by: INTERNAL MEDICINE

## 2022-12-08 ENCOUNTER — TELEPHONE (OUTPATIENT)
Dept: INTERNAL MEDICINE CLINIC | Age: 72
End: 2022-12-08

## 2022-12-08 RX ORDER — NIRMATRELVIR AND RITONAVIR 300-100 MG
KIT ORAL
Qty: 30 TABLET | Refills: 0 | Status: SHIPPED | OUTPATIENT
Start: 2022-12-08 | End: 2022-12-13

## 2022-12-08 NOTE — TELEPHONE ENCOUNTER
Tested positive for covid this morning     Symptoms- body aches, ear aches, congested, sore throat     Would like medication for this     [facility] [id number] - [address], [address] [facility]      Please advise

## 2022-12-28 ENCOUNTER — TELEPHONE (OUTPATIENT)
Dept: INTERNAL MEDICINE CLINIC | Age: 72
End: 2022-12-28

## 2022-12-28 DIAGNOSIS — R06.02 SHORTNESS OF BREATH: Primary | ICD-10-CM

## 2022-12-28 NOTE — TELEPHONE ENCOUNTER
Patient called in stating that's he spoke with the doctor on call about her lingering symptoms of covid and was told to call and get order for a chest x ray if she starts wheezing. The patient is currently wheezing and would like the order put in. Pls call and advise.

## 2023-01-04 ENCOUNTER — OFFICE VISIT (OUTPATIENT)
Dept: INTERNAL MEDICINE CLINIC | Age: 73
End: 2023-01-04
Payer: MEDICARE

## 2023-01-04 VITALS
WEIGHT: 128.6 LBS | HEIGHT: 65 IN | DIASTOLIC BLOOD PRESSURE: 80 MMHG | TEMPERATURE: 98.1 F | BODY MASS INDEX: 21.43 KG/M2 | SYSTOLIC BLOOD PRESSURE: 100 MMHG | OXYGEN SATURATION: 98 % | HEART RATE: 82 BPM

## 2023-01-04 DIAGNOSIS — U07.1 COVID-19 VIRUS INFECTION: Primary | ICD-10-CM

## 2023-01-04 PROCEDURE — 1036F TOBACCO NON-USER: CPT | Performed by: INTERNAL MEDICINE

## 2023-01-04 PROCEDURE — G8399 PT W/DXA RESULTS DOCUMENT: HCPCS | Performed by: INTERNAL MEDICINE

## 2023-01-04 PROCEDURE — G8484 FLU IMMUNIZE NO ADMIN: HCPCS | Performed by: INTERNAL MEDICINE

## 2023-01-04 PROCEDURE — 1123F ACP DISCUSS/DSCN MKR DOCD: CPT | Performed by: INTERNAL MEDICINE

## 2023-01-04 PROCEDURE — G8427 DOCREV CUR MEDS BY ELIG CLIN: HCPCS | Performed by: INTERNAL MEDICINE

## 2023-01-04 PROCEDURE — G8420 CALC BMI NORM PARAMETERS: HCPCS | Performed by: INTERNAL MEDICINE

## 2023-01-04 PROCEDURE — 3017F COLORECTAL CA SCREEN DOC REV: CPT | Performed by: INTERNAL MEDICINE

## 2023-01-04 PROCEDURE — 99213 OFFICE O/P EST LOW 20 MIN: CPT | Performed by: INTERNAL MEDICINE

## 2023-01-04 PROCEDURE — 1090F PRES/ABSN URINE INCON ASSESS: CPT | Performed by: INTERNAL MEDICINE

## 2023-01-04 RX ORDER — DEXTROMETHORPHAN HYDROBROMIDE AND PROMETHAZINE HYDROCHLORIDE 15; 6.25 MG/5ML; MG/5ML
5 SYRUP ORAL 4 TIMES DAILY PRN
Qty: 200 ML | Refills: 2 | Status: SHIPPED | OUTPATIENT
Start: 2023-01-04

## 2023-01-04 SDOH — ECONOMIC STABILITY: FOOD INSECURITY: WITHIN THE PAST 12 MONTHS, YOU WORRIED THAT YOUR FOOD WOULD RUN OUT BEFORE YOU GOT MONEY TO BUY MORE.: NEVER TRUE

## 2023-01-04 SDOH — ECONOMIC STABILITY: FOOD INSECURITY: WITHIN THE PAST 12 MONTHS, THE FOOD YOU BOUGHT JUST DIDN'T LAST AND YOU DIDN'T HAVE MONEY TO GET MORE.: NEVER TRUE

## 2023-01-04 ASSESSMENT — ENCOUNTER SYMPTOMS
NAUSEA: 0
BACK PAIN: 0
ABDOMINAL PAIN: 0
COUGH: 1
EYE REDNESS: 0
WHEEZING: 1
SHORTNESS OF BREATH: 0
CHEST TIGHTNESS: 0

## 2023-01-04 ASSESSMENT — SOCIAL DETERMINANTS OF HEALTH (SDOH): HOW HARD IS IT FOR YOU TO PAY FOR THE VERY BASICS LIKE FOOD, HOUSING, MEDICAL CARE, AND HEATING?: NOT HARD AT ALL

## 2023-01-04 ASSESSMENT — PATIENT HEALTH QUESTIONNAIRE - PHQ9
SUM OF ALL RESPONSES TO PHQ QUESTIONS 1-9: 0
9. THOUGHTS THAT YOU WOULD BE BETTER OFF DEAD, OR OF HURTING YOURSELF: 0
2. FEELING DOWN, DEPRESSED OR HOPELESS: 0
5. POOR APPETITE OR OVEREATING: 0
SUM OF ALL RESPONSES TO PHQ QUESTIONS 1-9: 0
3. TROUBLE FALLING OR STAYING ASLEEP: 0
7. TROUBLE CONCENTRATING ON THINGS, SUCH AS READING THE NEWSPAPER OR WATCHING TELEVISION: 0
SUM OF ALL RESPONSES TO PHQ QUESTIONS 1-9: 0
SUM OF ALL RESPONSES TO PHQ QUESTIONS 1-9: 0
SUM OF ALL RESPONSES TO PHQ9 QUESTIONS 1 & 2: 0
4. FEELING TIRED OR HAVING LITTLE ENERGY: 0
10. IF YOU CHECKED OFF ANY PROBLEMS, HOW DIFFICULT HAVE THESE PROBLEMS MADE IT FOR YOU TO DO YOUR WORK, TAKE CARE OF THINGS AT HOME, OR GET ALONG WITH OTHER PEOPLE: 0
8. MOVING OR SPEAKING SO SLOWLY THAT OTHER PEOPLE COULD HAVE NOTICED. OR THE OPPOSITE, BEING SO FIGETY OR RESTLESS THAT YOU HAVE BEEN MOVING AROUND A LOT MORE THAN USUAL: 0
1. LITTLE INTEREST OR PLEASURE IN DOING THINGS: 0
6. FEELING BAD ABOUT YOURSELF - OR THAT YOU ARE A FAILURE OR HAVE LET YOURSELF OR YOUR FAMILY DOWN: 0

## 2023-01-04 NOTE — PROGRESS NOTES
Subjective:      Patient ID: Gaurang Hall is a 71 y.o. female    Chief Complaint   Patient presents with    Post-COVID Symptoms     Lingering cough        HPI    Covid 19 infection   Tested + 12/8/2022. She was started on Paxlovid. She had prominent fatigue, slight wheezing, coughing and congestion. She had a chest x-ray 12/29/2022 which was normal.  She did take a walk with her  the other day. Did not feel short of breath on her walk. She does feel like her mental acuity is slightly foggy. Current Outpatient Medications on File Prior to Visit   Medication Sig Dispense Refill    atorvastatin (LIPITOR) 40 MG tablet TAKE 1 TABLET DAILY 90 tablet 1    sertraline (ZOLOFT) 100 MG tablet TAKE 1 AND 1/2 TABLETS     DAILY 135 tablet 3    alendronate (FOSAMAX) 70 MG tablet TAKE 1 TABLET EVERY 7 DAYS 12 tablet 3    Calcium Citrate-Vitamin D (CITRACAL + D PO) Take by mouth      cycloSPORINE (RESTASIS) 0.05 % ophthalmic emulsion 1 drop 2 times daily      Tretinoin (RETIN-A EX) Apply  topically. No current facility-administered medications on file prior to visit. Allergies   Allergen Reactions    Sulfa Antibiotics Rash     Patient developed a rash and muscle pain.        Past Medical History:   Diagnosis Date    Adenomatous polyp of colon 9/10/2020    Age-related osteoporosis without current pathological fracture 1/31/2018    Depression     Hyperlipidemia     Spinal stenosis of cervical region 12/3/2021     Past Surgical History:   Procedure Laterality Date    US THYROID BIOPSY  2/25/2022    US THYROID BIOPSY     Social History     Socioeconomic History    Marital status:      Spouse name: Not on file    Number of children: Not on file    Years of education: Not on file    Highest education level: Not on file   Occupational History    Not on file   Tobacco Use    Smoking status: Never    Smokeless tobacco: Never   Substance and Sexual Activity    Alcohol use: No    Drug use: Not on file Sexual activity: Not on file   Other Topics Concern    Not on file   Social History Narrative    Not on file     Social Determinants of Health     Financial Resource Strain: Low Risk     Difficulty of Paying Living Expenses: Not hard at all   Food Insecurity: No Food Insecurity    Worried About Running Out of Food in the Last Year: Never true    Ran Out of Food in the Last Year: Never true   Transportation Needs: Not on file   Physical Activity: Not on file   Stress: Not on file   Social Connections: Not on file   Intimate Partner Violence: Not on file   Housing Stability: Not on file     Family History   Problem Relation Age of Onset    Alzheimer's Disease Mother     Heart Disease Mother     Depression Mother     Cancer Father      Immunization History   Administered Date(s) Administered    COVID-19, PFIZER PURPLE top, DILUTE for use, (age 15 y+), 30mcg/0.3mL 03/26/2021, 04/21/2021, 12/03/2021    Influenza Vaccine, unspecified formulation 10/30/2017    Influenza, FLUAD, (age 72 y+), Adjuvanted, 0.5mL 09/10/2020, 10/21/2021, 11/11/2022    Influenza, FLUBLOK, (age 25 y+), PF, 0.5mL 10/24/2019    Influenza, High Dose (Fluzone 65 yrs and older) 12/15/2016, 01/31/2018    Influenza, Intradermal, Preservative free 11/15/2013    Pneumococcal Conjugate 13-valent ([id number]) 12/15/2016    Pneumococcal Polysaccharide ([id number]) 05/29/2019    Tdap (Boostrix, Adacel) 06/05/2009, 06/04/2021    Zoster Live (Zostavax) 04/21/2015    Zoster Recombinant (Shingrix) 09/10/2019       Review of Systems   Constitutional:  Positive for fatigue. Negative for fever and unexpected weight change. HENT:  Negative for congestion and hearing loss. Eyes:  Negative for redness and visual disturbance. Respiratory:  Positive for cough and wheezing (rare wheeze). Negative for chest tightness and shortness of breath. Cardiovascular:  Negative for chest pain and leg swelling. Gastrointestinal:  Negative for abdominal pain and nausea. Endocrine: Negative for polydipsia and polyuria. Genitourinary:  Negative for dysuria and frequency. Musculoskeletal:  Negative for arthralgias, back pain and neck pain. Skin:  Negative for rash and wound. Neurological:  Negative for dizziness, speech difficulty, weakness, light-headedness and headaches. Hematological:  Negative for adenopathy. Does not bruise/bleed easily. Psychiatric/Behavioral:  Negative for sleep disturbance. The patient is not nervous/anxious. Objective:    Physical Exam  Constitutional:       Appearance: Normal appearance. She is well-developed. Eyes:      Extraocular Movements: Extraocular movements intact. Cardiovascular:      Rate and Rhythm: Normal rate and regular rhythm. Heart sounds: No murmur heard. Pulmonary:      Effort: Pulmonary effort is normal.      Breath sounds: Normal breath sounds. Skin:     General: Skin is warm and dry. Findings: No rash. Neurological:      Mental Status: She is alert and oriented to person, place, and time. Psychiatric:         Mood and Affect: Mood normal.     Vitals:    01/04/23 1101   BP: 100/80   Pulse: 82   Temp: 98.1 °F (36.7 °C)   SpO2: 98%       Assessment and plan       1. COVID-19 virus infection  Slow improvement in COVID-19 infection aftermath symptoms. Nighttime cough suppressant requested. We discussed possible steroid treatment for persistent cough and she declined at this time. - promethazine-dextromethorphan (PROMETHAZINE-DM) 6.25-15 MG/5ML syrup;  Take 5 mLs by mouth 4 times daily as needed for Cough  Dispense: 200 mL; Refill: 2

## 2023-02-20 ENCOUNTER — TELEPHONE (OUTPATIENT)
Dept: INTERNAL MEDICINE CLINIC | Age: 73
End: 2023-02-20

## 2023-02-20 DIAGNOSIS — Z00.00 PE (PHYSICAL EXAM), ANNUAL: ICD-10-CM

## 2023-02-20 DIAGNOSIS — E78.5 HYPERLIPIDEMIA, UNSPECIFIED HYPERLIPIDEMIA TYPE: ICD-10-CM

## 2023-02-20 DIAGNOSIS — E55.9 VITAMIN D DEFICIENCY: Primary | ICD-10-CM

## 2023-02-20 NOTE — TELEPHONE ENCOUNTER
----- Message from [facility] sent at 2/20/2023 10:09 AM EST -----  Subject: Referral Request    Reason for referral request? Attention Dr Ericka Spencer: pt has appt for [id number] [place]   2/23/23 and wanting to get BW prior no orders in system at this time   please call pt asap  Provider patient wants to be referred to(if known):     Provider Phone Number(if known):     Additional Information for Provider?   ---------------------------------------------------------------------------  --------------  [address]    [phone]; OK to leave message on voicemail,Do not leave any message,   patient will call back for answer  ---------------------------------------------------------------------------  --------------

## 2023-02-22 LAB
ALBUMIN SERPL-MCNC: 4.5 GM/DL (ref 3.2–4.6)
ALP BLD-CCNC: 74 U/L (ref 36–123)
ALT SERPL-CCNC: 16 U/L
ANION GAP SERPL CALCULATED.3IONS-SCNC: 9 MMOL/L (ref 7–16)
AST SERPL-CCNC: 26 U/L
BASOPHILS ABSOLUTE: 0.1 X10(3)/MCL (ref 0–0.1)
BASOPHILS RELATIVE PERCENT: 0.7 %
BILIRUB SERPL-MCNC: 0.2 MG/DL (ref 0.1–1.3)
BUN BLDV-MCNC: 20 MG/DL (ref 8–23)
CALCIUM SERPL-MCNC: 10.2 MG/DL (ref 8.8–10.4)
CHLORIDE BLD-SCNC: 103 MMOL/L (ref 98–107)
CO2: 28 MMOL/L (ref 22–29)
CREAT SERPL-MCNC: 0.79 MG/DL (ref 0.51–1.3)
EOSINOPHILS ABSOLUTE: 0.2 X10(3)/MCL (ref 0–0.5)
EOSINOPHILS RELATIVE PERCENT: 2.6 %
GFR, ESTIMATED: 80 ML/MIN/1.73 M2
GLUCOSE BLD-MCNC: 74 MG/DL (ref 82–100)
GRANULOCYTE IMMATURE ABS: 0 X10(3)/MCL (ref 0–0.1)
HCT VFR BLD CALC: 42.3 % (ref 34–45)
HEMOGLOBIN: 13.7 G/DL (ref 11.2–15.7)
IMMATURE GRANULOCYTES: 0.3 %
LYMPHOCYTES ABSOLUTE: 2.1 X10(3)/MCL (ref 1.2–3.9)
LYMPHOCYTES ABSOLUTE: 29.6 %
MCH RBC QN AUTO: 31.1 PG (ref 26–34)
MCHC RBC AUTO-ENTMCNC: 32.4 G/DL (ref 30.7–35.5)
MCV RBC AUTO: 96.1 FL (ref 80–100)
MONOCYTES # BLD: 8.5 %
MONOCYTES ABSOLUTE: 0.6 X10(3)/MCL (ref 0.3–0.9)
NEUTROPHILS ABSOLUTE: 4.1 X10(3)/MCL (ref 1.6–6.1)
PDW BLD-RTO: 13.2 %
PLATELET # BLD: 220 X10(3)/MCL (ref 155–369)
PMV BLD AUTO: 10.2 FL (ref 8.8–12.5)
POTASSIUM SERPL-SCNC: 4 MMOL/L (ref 3.5–5)
RBC # BLD: 4.4 X10(6)/MCL (ref 3.9–5.2)
SEGMENTED NEUTROPHILS RELATIVE PERCENT: 58.3 %
SODIUM BLD-SCNC: 140 MMOL/L (ref 136–145)
T4 FREE: 0.99 NG/DL (ref 0.8–1.8)
TOTAL PROTEIN: 6.9 GM/DL (ref 6.4–8.3)
TSH ULTRASENSITIVE: 4.41 MCIU/ML (ref 0.27–4.2)
VITAMIN D 25-HYDROXY: 35.9 NG/ML (ref 30–150)
WBC # BLD: 7.1 X10(3)/MCL (ref 3.7–10.3)

## 2023-02-23 ENCOUNTER — OFFICE VISIT (OUTPATIENT)
Dept: INTERNAL MEDICINE CLINIC | Age: 73
End: 2023-02-23

## 2023-02-23 VITALS
OXYGEN SATURATION: 98 % | TEMPERATURE: 97.9 F | HEART RATE: 70 BPM | DIASTOLIC BLOOD PRESSURE: 80 MMHG | BODY MASS INDEX: 20.66 KG/M2 | SYSTOLIC BLOOD PRESSURE: 110 MMHG | HEIGHT: 65 IN | WEIGHT: 124 LBS

## 2023-02-23 DIAGNOSIS — D12.6 ADENOMATOUS POLYP OF COLON, UNSPECIFIED PART OF COLON: ICD-10-CM

## 2023-02-23 DIAGNOSIS — E04.2 MULTINODULAR GOITER: ICD-10-CM

## 2023-02-23 DIAGNOSIS — E55.9 VITAMIN D DEFICIENCY: ICD-10-CM

## 2023-02-23 DIAGNOSIS — Z00.00 PE (PHYSICAL EXAM), ANNUAL: Primary | ICD-10-CM

## 2023-02-23 DIAGNOSIS — E78.5 HYPERLIPIDEMIA, UNSPECIFIED HYPERLIPIDEMIA TYPE: ICD-10-CM

## 2023-02-23 SDOH — ECONOMIC STABILITY: FOOD INSECURITY: WITHIN THE PAST 12 MONTHS, THE FOOD YOU BOUGHT JUST DIDN'T LAST AND YOU DIDN'T HAVE MONEY TO GET MORE.: NEVER TRUE

## 2023-02-23 SDOH — ECONOMIC STABILITY: HOUSING INSECURITY
IN THE LAST 12 MONTHS, WAS THERE A TIME WHEN YOU DID NOT HAVE A STEADY PLACE TO SLEEP OR SLEPT IN A SHELTER (INCLUDING NOW)?: NO

## 2023-02-23 SDOH — ECONOMIC STABILITY: FOOD INSECURITY: WITHIN THE PAST 12 MONTHS, YOU WORRIED THAT YOUR FOOD WOULD RUN OUT BEFORE YOU GOT MONEY TO BUY MORE.: NEVER TRUE

## 2023-02-23 SDOH — ECONOMIC STABILITY: INCOME INSECURITY: HOW HARD IS IT FOR YOU TO PAY FOR THE VERY BASICS LIKE FOOD, HOUSING, MEDICAL CARE, AND HEATING?: NOT HARD AT ALL

## 2023-02-23 ASSESSMENT — PATIENT HEALTH QUESTIONNAIRE - PHQ9
4. FEELING TIRED OR HAVING LITTLE ENERGY: 0
9. THOUGHTS THAT YOU WOULD BE BETTER OFF DEAD, OR OF HURTING YOURSELF: 0
10. IF YOU CHECKED OFF ANY PROBLEMS, HOW DIFFICULT HAVE THESE PROBLEMS MADE IT FOR YOU TO DO YOUR WORK, TAKE CARE OF THINGS AT HOME, OR GET ALONG WITH OTHER PEOPLE: 0
6. FEELING BAD ABOUT YOURSELF - OR THAT YOU ARE A FAILURE OR HAVE LET YOURSELF OR YOUR FAMILY DOWN: 0
SUM OF ALL RESPONSES TO PHQ QUESTIONS 1-9: 0
7. TROUBLE CONCENTRATING ON THINGS, SUCH AS READING THE NEWSPAPER OR WATCHING TELEVISION: 0
1. LITTLE INTEREST OR PLEASURE IN DOING THINGS: 0
SUM OF ALL RESPONSES TO PHQ QUESTIONS 1-9: 0
5. POOR APPETITE OR OVEREATING: 0
SUM OF ALL RESPONSES TO PHQ QUESTIONS 1-9: 0
3. TROUBLE FALLING OR STAYING ASLEEP: 0
2. FEELING DOWN, DEPRESSED OR HOPELESS: 0
SUM OF ALL RESPONSES TO PHQ9 QUESTIONS 1 & 2: 0
8. MOVING OR SPEAKING SO SLOWLY THAT OTHER PEOPLE COULD HAVE NOTICED. OR THE OPPOSITE, BEING SO FIGETY OR RESTLESS THAT YOU HAVE BEEN MOVING AROUND A LOT MORE THAN USUAL: 0
SUM OF ALL RESPONSES TO PHQ QUESTIONS 1-9: 0

## 2023-02-23 ASSESSMENT — LIFESTYLE VARIABLES
HOW MANY STANDARD DRINKS CONTAINING ALCOHOL DO YOU HAVE ON A TYPICAL DAY: 1 OR 2
HOW OFTEN DO YOU HAVE A DRINK CONTAINING ALCOHOL: MONTHLY OR LESS

## 2023-02-23 NOTE — PROGRESS NOTES
Annual Wellness Visit     Patient:  Flavio Alarcon                                               : 1950  Age: 71 y.o. MRN: [id number]  Date : 2023      CHIEF COMPLAINT: Flavio Alarcon is a 71 y.o. female who presents for : Physical exam 1. Adenomatous polyp of colon, unspecified part of colon  Problem is stable she is up-to-date on    2. Hyperlipidemia, unspecified hyperlipidemia type  No complaints no myalgia    3. Vitamin D deficiency      4.  Multinodular goiter  Had a benign nodule followed by endocrinology  Physical exam generally feels good denies any chest pain shortness of breath or any other problems      Patient Active Problem List    Diagnosis Date Noted    Multinodular goiter 2023     Priority: Medium    Spinal stenosis of cervical region 2021    Adenomatous polyp of colon 09/10/2020    Age-related osteoporosis without current pathological fracture 2018    Stress at home 2016    Depression 2011    Hyperlipidemia 2011    Vitamin D deficiency 2011       Constitutional:  Denies fever or chills   Eyes:  Denies change in visual acuity   HENT:  Denies nasal congestion or sore throat   Respiratory:  Denies cough or shortness of breath   Cardiovascular:  Denies chest pain or edema   GI:  Denies abdominal pain, nausea, vomiting, bloody stools or diarrhea   :  Denies dysuria   Musculoskeletal:  Denies back pain or joint pain   Integument:  Denies rash   Neurologic:  Denies headache, focal weakness or sensory changes   Endocrine:  Denies polyuria or polydipsia   Lymphatic:  Denies swollen glands   Psychiatric:  Denies depression or anxiety     Past Medical History:        Diagnosis Date    Adenomatous polyp of colon 9/10/2020    Age-related osteoporosis without current pathological fracture 2018    Depression     Hyperlipidemia     Multinodular goiter 2023    Spinal stenosis of cervical region 12/3/2021       Past Surgical History: Procedure Laterality Date    US THYROID BIOPSY  2/25/2022    US THYROID BIOPSY       Family History:  Family History   Problem Relation Age of Onset    Alzheimer's Disease Mother     Heart Disease Mother     Depression Mother     Cancer Father        Social History:  Social History     Socioeconomic History    Marital status:    Tobacco Use    Smoking status: Never    Smokeless tobacco: Never   Substance and Sexual Activity    Alcohol use: No     Social Determinants of Health     Financial Resource Strain: Low Risk     Difficulty of Paying Living Expenses: Not hard at all   Food Insecurity: No Food Insecurity    Worried About Running Out of Food in the Last Year: Never true    Ran Out of Food in the Last Year: Never true   Transportation Needs: Unknown    Lack of Transportation (Non-Medical): No   Physical Activity: Sufficiently Active    Days of Exercise per Week: 3 days    Minutes of Exercise per Session: 60 min   Housing Stability: Unknown    Unstable Housing in the Last Year: No         Allergies:  Sulfa antibiotics    Current Medications:    Prior to Admission medications    Medication Sig Start Date End Date Taking? Authorizing Provider   promethazine-dextromethorphan (PROMETHAZINE-DM) 6.25-15 MG/5ML syrup Take 5 mLs by mouth 4 times daily as needed for Cough 1/4/23  Yes Marisela Bryson MD   atorvastatin (LIPITOR) 40 MG tablet TAKE 1 TABLET DAILY 11/8/22  Yes Carlyn Ferreira MD   sertraline (ZOLOFT) 100 MG tablet TAKE 1 AND 1/2 TABLETS     DAILY 5/16/22  Yes Carlyn Ferreira MD   alendronate (FOSAMAX) 70 MG tablet TAKE 1 TABLET EVERY 7 DAYS 3/7/22  Yes Carlyn Ferreira MD   Calcium Citrate-Vitamin D (CITRACAL + D PO) Take by mouth   Yes Historical Provider, MD   cycloSPORINE (RESTASIS) 0.05 % ophthalmic emulsion 1 drop 2 times daily   Yes Historical Provider, MD   Tretinoin (RETIN-A EX) Apply  topically.   Patient not taking: Reported on 2/23/2023    Historical Provider, MD           Physical Exam: Constitutional:  Well developed, well nourished, no acute distress, non-toxic appearance   Eyes:  PERRL, conjunctiva normal   HENT:  Atraumatic, external ears normal, nose normal, oropharynx moist, no pharyngeal exudates. Neck- normal range of motion, no tenderness, supple   Respiratory:  No respiratory distress, normal breath sounds, no rales, no wheezing   Cardiovascular:  Normal rate, normal rhythm, no murmurs, no gallops, no rubs   GI:  Soft, nondistended, normal bowel sounds, nontender, no organomegaly, no mass, no rebound, no guarding   :  No costovertebral angle tenderness   Musculoskeletal:  No edema, no tenderness, no deformities. Back- no tenderness  Integument:  Well hydrated, no rash   Lymphatic:  No lymphadenopathy noted   Neurologic:  Alert & oriented x 3, CN 2-12 normal, normal motor function, normal sensory function, no focal deficits noted   Psychiatric:  Speech and behavior appropriate       Vitals: /80   Pulse 70   Temp 97.9 °F (36.6 °C)   Ht 5' 4.5\" (1.638 m)   Wt 124 lb (56.2 kg)   SpO2 98%   BMI 20.96 kg/m²     Body mass index is 20.96 kg/m².   Wt Readings from Last 3 Encounters:   02/23/23 124 lb (56.2 kg)   01/04/23 128 lb 9.6 oz (58.3 kg)   12/03/21 125 lb (56.7 kg)         LABS:    CBC:   Lab Results   Component Value Date    WBC 7.1 02/22/2023    HGB 13.7 02/22/2023    HCT 42.3 02/22/2023    MCV 96.1 02/22/2023     02/22/2023         No results found for: IRON, TIBC, FERRITIN, FOLATE, [id number], PTH                                                          BMP:    Lab Results   Component Value Date     02/22/2023    K 4.0 02/22/2023     02/22/2023    CO2 28 02/22/2023       LFT's:   Lab Results   Component Value Date    ALT 16 02/22/2023    AST 26 02/22/2023    ALKPHOS 74 02/22/2023    BILITOT 0.2 02/22/2023       Lipids:   Lab Results   Component Value Date    CHOL 213 (H) 09/05/2020    HDL 67 09/05/2020    LDLCALC 121 (H) 09/05/2020    TRIG 146 09/05/2020       INR: No results found for: INR, PROTIME    U/A:  Lab Results   Component Value Date    LABMICR See below: 09/05/2020    LABMICR CANCELED 09/05/2020        No results found for: LABA1C     Lab Results   Component Value Date    CREATININE 0.79 02/22/2023       -----------------------------------------------------------------     Assessment/Plan:   1. Adenomatous polyp of colon, unspecified part of colon  This problem is stable followed by GI    2. Hyperlipidemia, unspecified hyperlipidemia type  This problem is stable check above labs continue present meds    3. Vitamin D deficiency  Above labs    4.  Multinodular goiter    TSH we will repeat TSH and 6 months followed by endocrinology   physical exam check screening labs continue diet and is up-to-date on all her immunizations

## 2023-02-23 NOTE — PROGRESS NOTES
Medicare Annual Wellness Visit    Flavio Alarcon is here for Medicare AWV [place] reviews labs)         No follow-ups on file. Subjective       Patient's complete Health Risk Assessment and screening values have been reviewed and are found in Flowsheets. The following problems were reviewed today and where indicated follow up appointments were made and/or referrals ordered. Positive Risk Factor Screenings with Interventions:                Social and Emotional Support:  Do you get the social and emotional support that you need?: (!) No  Interventions:  Patient declined any further interventions or treatment                          Objective   Vitals:    02/23/23 1518   BP: 110/80   Pulse: 70   Temp: 97.9 °F (36.6 °C)   SpO2: 98%   Weight: 124 lb (56.2 kg)   Height: 5' 4.5\" (1.638 m)      Body mass index is 20.96 kg/m².       General Appearance: alert and oriented to person, place and time, well developed and well- nourished, in no acute distress  Skin: warm and dry, no rash or erythema  Head: normocephalic and atraumatic  Eyes: pupils equal, round, and reactive to light, extraocular eye movements intact, conjunctivae normal  ENT: tympanic membrane, external ear and ear canal normal bilaterally, nose without deformity, nasal mucosa and turbinates normal without polyps  Neck: supple and non-tender without mass, no thyromegaly or thyroid nodules, no cervical lymphadenopathy  Pulmonary/Chest: clear to auscultation bilaterally- no wheezes, rales or rhonchi, normal air movement, no respiratory distress  Cardiovascular: normal rate, regular rhythm, normal S1 and S2, no murmurs, rubs, clicks, or gallops, distal pulses intact, no carotid bruits  Abdomen: soft, non-tender, non-distended, normal bowel sounds, no masses or organomegaly  Extremities: no cyanosis, clubbing or edema  Musculoskeletal: normal range of motion, no joint swelling, deformity or tenderness  Neurologic: reflexes normal and symmetric, no cranial nerve deficit, gait, coordination and speech normal       Allergies   Allergen Reactions    Sulfa Antibiotics Rash     Patient developed a rash and muscle pain. Prior to Visit Medications    Medication Sig Taking? Authorizing Provider   promethazine-dextromethorphan (PROMETHAZINE-DM) 6.25-15 MG/5ML syrup Take 5 mLs by mouth 4 times daily as needed for Cough Yes Marisela Bryson MD   atorvastatin (LIPITOR) 40 MG tablet TAKE 1 TABLET DAILY Yes Carlyn Ferreira MD   sertraline (ZOLOFT) 100 MG tablet TAKE 1 AND 1/2 TABLETS     DAILY Yes Carlyn Ferreira MD   alendronate (FOSAMAX) 70 MG tablet TAKE 1 TABLET EVERY 7 DAYS Yes Carlyn Ferreira MD   Calcium Citrate-Vitamin D (CITRACAL + D PO) Take by mouth Yes Historical Provider, MD   cycloSPORINE (RESTASIS) 0.05 % ophthalmic emulsion 1 drop 2 times daily Yes Historical Provider, MD   Tretinoin (RETIN-A EX) Apply  topically.   Patient not taking: Reported on 2/23/2023  Historical Provider, MD [place] (Including outside providers/suppliers regularly involved in providing care):   Patient Care Team:  Carlyn Ferreira MD as PCP - General (Internal Medicine)  Carlyn Ferreira MD as PCP - Empaneled Provider     Reviewed and updated this visit:  Allergies  Meds              Carlyn Ferreira MD

## 2023-02-24 ENCOUNTER — TELEPHONE (OUTPATIENT)
Dept: INTERNAL MEDICINE CLINIC | Age: 73
End: 2023-02-24

## 2023-02-24 DIAGNOSIS — R53.83 OTHER FATIGUE: Primary | ICD-10-CM

## 2023-02-24 LAB — ORGANISM ID, BACTERIA: NORMAL

## 2023-02-24 NOTE — TELEPHONE ENCOUNTER
Anna from [facility] called and said that they were unable to add on the free T4. She said that the [facility] already did the labwork so if we want to add it on we need to contact them and pt.

## 2023-04-21 RX ORDER — ALENDRONATE SODIUM 70 MG/1
TABLET ORAL
Qty: 12 TABLET | Refills: 3 | Status: SHIPPED | OUTPATIENT
Start: 2023-04-21

## 2023-04-21 NOTE — TELEPHONE ENCOUNTER
Medication:   Requested Prescriptions     Pending Prescriptions Disp Refills    alendronate (FOSAMAX) 70 MG tablet [Pharmacy Med Name: ALENDRONATE  TAB 70MG] 12 tablet 3     Sig: TAKE 1 TABLET EVERY 7 DAYS        Last Filled:  2/7/2023    Patient Phone Number: [phone] (home)     Last appt: 2/23/2023   Next appt: Visit date not found    Last OARRS: No flowsheet data found.

## 2023-05-17 RX ORDER — SERTRALINE HYDROCHLORIDE 100 MG/1
TABLET, FILM COATED ORAL
Qty: 135 TABLET | Refills: 3 | Status: SHIPPED | OUTPATIENT
Start: 2023-05-17

## 2023-05-17 NOTE — TELEPHONE ENCOUNTER
Patient needs refill:    sertraline (ZOLOFT) 100 MG tablet    [facility] [address] [facility], [address] [place] [address],[address] [phone] - F [phone]        Pls advise.

## 2023-06-30 DIAGNOSIS — E78.5 HYPERLIPIDEMIA, UNSPECIFIED HYPERLIPIDEMIA TYPE: ICD-10-CM

## 2023-06-30 RX ORDER — ATORVASTATIN CALCIUM 40 MG/1
TABLET, FILM COATED ORAL
Qty: 90 TABLET | Refills: 0 | Status: SHIPPED | OUTPATIENT
Start: 2023-06-30

## 2023-07-21 RX ORDER — ALENDRONATE SODIUM 70 MG/1
70 TABLET ORAL
Qty: 12 TABLET | Refills: 3 | Status: SHIPPED | OUTPATIENT
Start: 2023-07-21

## 2023-09-21 DIAGNOSIS — E78.5 HYPERLIPIDEMIA, UNSPECIFIED HYPERLIPIDEMIA TYPE: ICD-10-CM

## 2023-09-26 RX ORDER — ATORVASTATIN CALCIUM 40 MG/1
TABLET, FILM COATED ORAL
Qty: 90 TABLET | Refills: 0 | Status: SHIPPED | OUTPATIENT
Start: 2023-09-26

## 2023-12-23 DIAGNOSIS — E78.5 HYPERLIPIDEMIA, UNSPECIFIED HYPERLIPIDEMIA TYPE: ICD-10-CM

## 2023-12-26 RX ORDER — ATORVASTATIN CALCIUM 40 MG/1
TABLET, FILM COATED ORAL
Qty: 90 TABLET | Refills: 0 | Status: SHIPPED | OUTPATIENT
Start: 2023-12-26

## 2024-01-02 ENCOUNTER — OFFICE VISIT (OUTPATIENT)
Dept: FAMILY MEDICINE CLINIC | Age: 74
End: 2024-01-02
Payer: MEDICARE

## 2024-01-02 VITALS — BODY MASS INDEX: 21.26 KG/M2 | DIASTOLIC BLOOD PRESSURE: 72 MMHG | WEIGHT: 125.8 LBS | SYSTOLIC BLOOD PRESSURE: 110 MMHG

## 2024-01-02 DIAGNOSIS — R21 RASH: Primary | ICD-10-CM

## 2024-01-02 PROCEDURE — 1123F ACP DISCUSS/DSCN MKR DOCD: CPT | Performed by: STUDENT IN AN ORGANIZED HEALTH CARE EDUCATION/TRAINING PROGRAM

## 2024-01-02 PROCEDURE — 99213 OFFICE O/P EST LOW 20 MIN: CPT | Performed by: STUDENT IN AN ORGANIZED HEALTH CARE EDUCATION/TRAINING PROGRAM

## 2024-01-02 PROCEDURE — G8399 PT W/DXA RESULTS DOCUMENT: HCPCS | Performed by: STUDENT IN AN ORGANIZED HEALTH CARE EDUCATION/TRAINING PROGRAM

## 2024-01-02 PROCEDURE — G8427 DOCREV CUR MEDS BY ELIG CLIN: HCPCS | Performed by: STUDENT IN AN ORGANIZED HEALTH CARE EDUCATION/TRAINING PROGRAM

## 2024-01-02 PROCEDURE — 1036F TOBACCO NON-USER: CPT | Performed by: STUDENT IN AN ORGANIZED HEALTH CARE EDUCATION/TRAINING PROGRAM

## 2024-01-02 PROCEDURE — 1090F PRES/ABSN URINE INCON ASSESS: CPT | Performed by: STUDENT IN AN ORGANIZED HEALTH CARE EDUCATION/TRAINING PROGRAM

## 2024-01-02 PROCEDURE — 3017F COLORECTAL CA SCREEN DOC REV: CPT | Performed by: STUDENT IN AN ORGANIZED HEALTH CARE EDUCATION/TRAINING PROGRAM

## 2024-01-02 PROCEDURE — G8420 CALC BMI NORM PARAMETERS: HCPCS | Performed by: STUDENT IN AN ORGANIZED HEALTH CARE EDUCATION/TRAINING PROGRAM

## 2024-01-02 PROCEDURE — G8484 FLU IMMUNIZE NO ADMIN: HCPCS | Performed by: STUDENT IN AN ORGANIZED HEALTH CARE EDUCATION/TRAINING PROGRAM

## 2024-01-02 RX ORDER — TRIAMCINOLONE ACETONIDE 0.25 MG/G
OINTMENT TOPICAL
Qty: 1 EACH | Refills: 0 | Status: SHIPPED | OUTPATIENT
Start: 2024-01-02 | End: 2024-01-09

## 2024-01-02 RX ORDER — PERMETHRIN 50 MG/G
CREAM TOPICAL
Qty: 1 EACH | Refills: 0 | Status: SHIPPED | OUTPATIENT
Start: 2024-01-02

## 2024-01-02 ASSESSMENT — ENCOUNTER SYMPTOMS
NAUSEA: 0
RHINORRHEA: 0
ABDOMINAL PAIN: 0
SHORTNESS OF BREATH: 0
COUGH: 0
SORE THROAT: 0

## 2024-01-02 NOTE — PROGRESS NOTES
Sara Wong is a 73 y.o.female who presents with   Chief Complaint   Patient presents with    Rash     Started last week with a rash on right side of buttock   .    Portions of this chart may have been created with voice recognition software. Occasional wrong-word or \"sound-alike\" substitutions may have occurred due to the inherent limitations of voice recognition software. Read the chart carefully and recognize, using context, where these substitutions have occurred.    Patient presents for new onset rash that has been present over the last few days.  Patient states that the rash first appeared on her low back.  States that she developed some tiny little circles and now the entire area is erythematous and itchy.  Subsequently developed some bite like lesions on her hip and anterior thigh.  Patient denies traveling or having any pets.  Patient denies going out in nature.  Denies any use of new products.            Review of Systems   Constitutional:  Negative for chills and fatigue.   HENT:  Negative for rhinorrhea and sore throat.    Eyes:  Negative for visual disturbance.   Respiratory:  Negative for cough and shortness of breath.    Cardiovascular:  Negative for chest pain.   Gastrointestinal:  Negative for abdominal pain and nausea.   Genitourinary:  Negative for dysuria.   Musculoskeletal:  Negative for myalgias.   Skin:  Positive for rash. Negative for pallor.   Neurological:  Negative for dizziness, light-headedness and headaches.        Past Medical History:   Diagnosis Date    Adenomatous polyp of colon 9/10/2020    Age-related osteoporosis without current pathological fracture 1/31/2018    Depression     Hyperlipidemia     Multinodular goiter 2/23/2023    Spinal stenosis of cervical region 12/3/2021       Past Surgical History:   Procedure Laterality Date    US THYROID BIOPSY  2/25/2022     THYROID BIOPSY       Social History     Socioeconomic History    Marital status:      Spouse name: Not

## 2024-02-09 ENCOUNTER — TELEPHONE (OUTPATIENT)
Dept: INTERNAL MEDICINE CLINIC | Age: 74
End: 2024-02-09

## 2024-02-09 DIAGNOSIS — E55.9 VITAMIN D DEFICIENCY: ICD-10-CM

## 2024-02-09 DIAGNOSIS — E04.2 MULTINODULAR GOITER: ICD-10-CM

## 2024-02-09 DIAGNOSIS — E78.5 HYPERLIPIDEMIA, UNSPECIFIED HYPERLIPIDEMIA TYPE: ICD-10-CM

## 2024-02-09 DIAGNOSIS — Z11.59 ENCOUNTER FOR HEPATITIS C SCREENING TEST FOR LOW RISK PATIENT: Primary | ICD-10-CM

## 2024-02-09 NOTE — TELEPHONE ENCOUNTER
Pt needs yearly lab orders put in and emailed to her for upcoming appt on 2-26-24.     Please advise     [email or web address]

## 2024-02-26 ENCOUNTER — OFFICE VISIT (OUTPATIENT)
Dept: INTERNAL MEDICINE CLINIC | Age: 74
End: 2024-02-26
Payer: MEDICARE

## 2024-02-26 VITALS
HEART RATE: 88 BPM | BODY MASS INDEX: 20.83 KG/M2 | TEMPERATURE: 98.6 F | OXYGEN SATURATION: 98 % | HEIGHT: 65 IN | SYSTOLIC BLOOD PRESSURE: 104 MMHG | WEIGHT: 125 LBS | DIASTOLIC BLOOD PRESSURE: 62 MMHG

## 2024-02-26 DIAGNOSIS — E55.9 VITAMIN D DEFICIENCY: Primary | ICD-10-CM

## 2024-02-26 DIAGNOSIS — E78.5 HYPERLIPIDEMIA, UNSPECIFIED HYPERLIPIDEMIA TYPE: ICD-10-CM

## 2024-02-26 DIAGNOSIS — D12.6 ADENOMATOUS POLYP OF COLON, UNSPECIFIED PART OF COLON: ICD-10-CM

## 2024-02-26 DIAGNOSIS — E04.2 MULTINODULAR GOITER: ICD-10-CM

## 2024-02-26 DIAGNOSIS — M81.0 AGE-RELATED OSTEOPOROSIS WITHOUT CURRENT PATHOLOGICAL FRACTURE: ICD-10-CM

## 2024-02-26 PROCEDURE — G8484 FLU IMMUNIZE NO ADMIN: HCPCS | Performed by: INTERNAL MEDICINE

## 2024-02-26 PROCEDURE — 3017F COLORECTAL CA SCREEN DOC REV: CPT | Performed by: INTERNAL MEDICINE

## 2024-02-26 PROCEDURE — G0439 PPPS, SUBSEQ VISIT: HCPCS | Performed by: INTERNAL MEDICINE

## 2024-02-26 PROCEDURE — 1123F ACP DISCUSS/DSCN MKR DOCD: CPT | Performed by: INTERNAL MEDICINE

## 2024-02-26 SDOH — ECONOMIC STABILITY: FOOD INSECURITY: WITHIN THE PAST 12 MONTHS, YOU WORRIED THAT YOUR FOOD WOULD RUN OUT BEFORE YOU GOT MONEY TO BUY MORE.: NEVER TRUE

## 2024-02-26 SDOH — ECONOMIC STABILITY: INCOME INSECURITY: HOW HARD IS IT FOR YOU TO PAY FOR THE VERY BASICS LIKE FOOD, HOUSING, MEDICAL CARE, AND HEATING?: NOT HARD AT ALL

## 2024-02-26 SDOH — ECONOMIC STABILITY: FOOD INSECURITY: WITHIN THE PAST 12 MONTHS, THE FOOD YOU BOUGHT JUST DIDN'T LAST AND YOU DIDN'T HAVE MONEY TO GET MORE.: NEVER TRUE

## 2024-02-26 ASSESSMENT — PATIENT HEALTH QUESTIONNAIRE - PHQ9
7. TROUBLE CONCENTRATING ON THINGS, SUCH AS READING THE NEWSPAPER OR WATCHING TELEVISION: 0
9. THOUGHTS THAT YOU WOULD BE BETTER OFF DEAD, OR OF HURTING YOURSELF: 0
SUM OF ALL RESPONSES TO PHQ9 QUESTIONS 1 & 2: 0
SUM OF ALL RESPONSES TO PHQ QUESTIONS 1-9: 1
SUM OF ALL RESPONSES TO PHQ QUESTIONS 1-9: 1
4. FEELING TIRED OR HAVING LITTLE ENERGY: 0
2. FEELING DOWN, DEPRESSED OR HOPELESS: 0
1. LITTLE INTEREST OR PLEASURE IN DOING THINGS: 0
5. POOR APPETITE OR OVEREATING: 0
6. FEELING BAD ABOUT YOURSELF - OR THAT YOU ARE A FAILURE OR HAVE LET YOURSELF OR YOUR FAMILY DOWN: 0
SUM OF ALL RESPONSES TO PHQ QUESTIONS 1-9: 1
3. TROUBLE FALLING OR STAYING ASLEEP: 0
10. IF YOU CHECKED OFF ANY PROBLEMS, HOW DIFFICULT HAVE THESE PROBLEMS MADE IT FOR YOU TO DO YOUR WORK, TAKE CARE OF THINGS AT HOME, OR GET ALONG WITH OTHER PEOPLE: 0
8. MOVING OR SPEAKING SO SLOWLY THAT OTHER PEOPLE COULD HAVE NOTICED. OR THE OPPOSITE, BEING SO FIGETY OR RESTLESS THAT YOU HAVE BEEN MOVING AROUND A LOT MORE THAN USUAL: 1
SUM OF ALL RESPONSES TO PHQ QUESTIONS 1-9: 1

## 2024-02-26 ASSESSMENT — LIFESTYLE VARIABLES: HOW MANY STANDARD DRINKS CONTAINING ALCOHOL DO YOU HAVE ON A TYPICAL DAY: 1 OR 2

## 2024-02-26 NOTE — PROGRESS NOTES
Annual Wellness Visit     Patient:  Sara Wong                                               : 1950  Age: 73 y.o.  MRN: [id number]  Date : 2024      CHIEF COMPLAINT: Sara Wong is a 73 y.o. female who presents for : Physical exam    1. Vitamin D deficiency  This problem is stable    2. Multinodular goiter  This problem is stable getting yearly ultrasounds    3. Hyperlipidemia, unspecified hyperlipidemia type  No complaints no myalgias    4. Age-related osteoporosis without current pathological fracture  This problem is stable on Fosamax    5. Adenomatous polyp of colon, unspecified part of colon  This problem is stable up-to-date on her colonoscopy  Physical exam generally feels good denies any chest pain shortness of breath or any other problems does have occasional cervical radiculopathy symptoms      Patient Active Problem List    Diagnosis Date Noted    Multinodular goiter 2023     Priority: Medium    Spinal stenosis of cervical region 2021    Adenomatous polyp of colon 09/10/2020    Age-related osteoporosis without current pathological fracture 2018    Depression 2011    Hyperlipidemia 2011    Vitamin D deficiency 2011       Constitutional:  Denies fever or chills   Eyes:  Denies change in visual acuity   HENT:  Denies nasal congestion or sore throat   Respiratory:  Denies cough or shortness of breath   Cardiovascular:  Denies chest pain or edema   GI:  Denies abdominal pain, nausea, vomiting, bloody stools or diarrhea   :  Denies dysuria   Musculoskeletal:  Denies back pain or joint pain   Integument:  Denies rash   Neurologic:  Denies headache, focal weakness or sensory changes   Endocrine:  Denies polyuria or polydipsia   Lymphatic:  Denies swollen glands   Psychiatric:  Denies depression or anxiety     Past Medical History:        Diagnosis Date    Adenomatous polyp of colon 9/10/2020    Age-related osteoporosis without current pathological 
  Medication Sig Taking? Authorizing Provider   permethrin (ELIMITE) 5 % cream Apply topically as directed Yes Marly Brown MD   atorvastatin (LIPITOR) 40 MG tablet TAKE 1 TABLET DAILY Yes Jovan Mansfield MD   alendronate (FOSAMAX) 70 MG tablet Take 1 tablet by mouth every 7 days Yes Lawrence Burris MD   sertraline (ZOLOFT) 100 MG tablet 1 1/2 tablets daily Yes Lawrence Burris MD   Calcium Citrate-Vitamin D (CITRACAL + D PO) Take by mouth Yes ProviderBlanche MD   cycloSPORINE (RESTASIS) 0.05 % ophthalmic emulsion 1 drop 2 times daily Yes ProviderBlanche MD       CareTeam (Including outside providers/suppliers regularly involved in providing care):   Patient Care Team:  Lawrence Burris MD as PCP - General (Internal Medicine)  Lawrence Burris MD as PCP - Empaneled Provider     Reviewed and updated this visit:  Tobacco  Allergies  Meds  Med Hx  Surg Hx  Soc Hx  Fam Hx

## 2024-03-22 DIAGNOSIS — E78.5 HYPERLIPIDEMIA, UNSPECIFIED HYPERLIPIDEMIA TYPE: ICD-10-CM

## 2024-03-22 RX ORDER — ATORVASTATIN CALCIUM 40 MG/1
TABLET, FILM COATED ORAL
Qty: 90 TABLET | Refills: 0 | Status: SHIPPED | OUTPATIENT
Start: 2024-03-22

## 2024-04-11 RX ORDER — SERTRALINE HYDROCHLORIDE 100 MG/1
TABLET, FILM COATED ORAL
Qty: 135 TABLET | Refills: 3 | Status: SHIPPED | OUTPATIENT
Start: 2024-04-11

## 2024-06-17 DIAGNOSIS — E78.5 HYPERLIPIDEMIA, UNSPECIFIED HYPERLIPIDEMIA TYPE: ICD-10-CM

## 2024-06-17 RX ORDER — ALENDRONATE SODIUM 70 MG/1
70 TABLET ORAL
Qty: 12 TABLET | Refills: 1 | Status: SHIPPED | OUTPATIENT
Start: 2024-06-17

## 2024-06-17 RX ORDER — ATORVASTATIN CALCIUM 40 MG/1
TABLET, FILM COATED ORAL
Qty: 90 TABLET | Refills: 1 | Status: SHIPPED | OUTPATIENT
Start: 2024-06-17

## 2024-12-06 RX ORDER — ALENDRONATE SODIUM 70 MG/1
70 TABLET ORAL
Qty: 12 TABLET | Refills: 1 | Status: SHIPPED | OUTPATIENT
Start: 2024-12-06

## 2024-12-19 DIAGNOSIS — E78.5 HYPERLIPIDEMIA, UNSPECIFIED HYPERLIPIDEMIA TYPE: ICD-10-CM

## 2024-12-19 RX ORDER — ATORVASTATIN CALCIUM 40 MG/1
TABLET, FILM COATED ORAL
Qty: 90 TABLET | Refills: 1 | Status: SHIPPED | OUTPATIENT
Start: 2024-12-19

## 2025-02-21 ENCOUNTER — TELEPHONE (OUTPATIENT)
Dept: INTERNAL MEDICINE CLINIC | Age: 75
End: 2025-02-21

## 2025-02-21 DIAGNOSIS — Z11.59 ENCOUNTER FOR HEPATITIS C SCREENING TEST FOR LOW RISK PATIENT: ICD-10-CM

## 2025-02-21 DIAGNOSIS — E78.5 HYPERLIPIDEMIA, UNSPECIFIED HYPERLIPIDEMIA TYPE: Primary | ICD-10-CM

## 2025-02-21 DIAGNOSIS — E04.2 MULTINODULAR GOITER: ICD-10-CM

## 2025-02-21 DIAGNOSIS — E55.9 VITAMIN D DEFICIENCY: ICD-10-CM

## 2025-02-21 NOTE — TELEPHONE ENCOUNTER
Pt needs lab work orders placed and needs hard copy to be emailed    [email or web address]    [phone]  Pls call and advise

## 2025-03-04 ENCOUNTER — OFFICE VISIT (OUTPATIENT)
Dept: INTERNAL MEDICINE CLINIC | Age: 75
End: 2025-03-04

## 2025-03-04 VITALS
BODY MASS INDEX: 20.99 KG/M2 | DIASTOLIC BLOOD PRESSURE: 64 MMHG | WEIGHT: 126 LBS | HEIGHT: 65 IN | TEMPERATURE: 98.1 F | SYSTOLIC BLOOD PRESSURE: 104 MMHG | HEART RATE: 76 BPM | OXYGEN SATURATION: 98 %

## 2025-03-04 DIAGNOSIS — E78.5 HYPERLIPIDEMIA, UNSPECIFIED HYPERLIPIDEMIA TYPE: ICD-10-CM

## 2025-03-04 DIAGNOSIS — E04.2 MULTINODULAR GOITER: Primary | ICD-10-CM

## 2025-03-04 DIAGNOSIS — E55.9 VITAMIN D DEFICIENCY: ICD-10-CM

## 2025-03-04 DIAGNOSIS — F32.A DEPRESSION, UNSPECIFIED DEPRESSION TYPE: ICD-10-CM

## 2025-03-04 DIAGNOSIS — D12.6 ADENOMATOUS POLYP OF COLON, UNSPECIFIED PART OF COLON: ICD-10-CM

## 2025-03-04 SDOH — ECONOMIC STABILITY: FOOD INSECURITY: WITHIN THE PAST 12 MONTHS, YOU WORRIED THAT YOUR FOOD WOULD RUN OUT BEFORE YOU GOT MONEY TO BUY MORE.: NEVER TRUE

## 2025-03-04 SDOH — ECONOMIC STABILITY: FOOD INSECURITY: WITHIN THE PAST 12 MONTHS, THE FOOD YOU BOUGHT JUST DIDN'T LAST AND YOU DIDN'T HAVE MONEY TO GET MORE.: NEVER TRUE

## 2025-03-04 ASSESSMENT — PATIENT HEALTH QUESTIONNAIRE - PHQ9
9. THOUGHTS THAT YOU WOULD BE BETTER OFF DEAD, OR OF HURTING YOURSELF: NOT AT ALL
3. TROUBLE FALLING OR STAYING ASLEEP: NOT AT ALL
SUM OF ALL RESPONSES TO PHQ QUESTIONS 1-9: 0
2. FEELING DOWN, DEPRESSED OR HOPELESS: NOT AT ALL
1. LITTLE INTEREST OR PLEASURE IN DOING THINGS: NOT AT ALL
6. FEELING BAD ABOUT YOURSELF - OR THAT YOU ARE A FAILURE OR HAVE LET YOURSELF OR YOUR FAMILY DOWN: NOT AT ALL
8. MOVING OR SPEAKING SO SLOWLY THAT OTHER PEOPLE COULD HAVE NOTICED. OR THE OPPOSITE, BEING SO FIGETY OR RESTLESS THAT YOU HAVE BEEN MOVING AROUND A LOT MORE THAN USUAL: NOT AT ALL
7. TROUBLE CONCENTRATING ON THINGS, SUCH AS READING THE NEWSPAPER OR WATCHING TELEVISION: NOT AT ALL
5. POOR APPETITE OR OVEREATING: NOT AT ALL
4. FEELING TIRED OR HAVING LITTLE ENERGY: NOT AT ALL
10. IF YOU CHECKED OFF ANY PROBLEMS, HOW DIFFICULT HAVE THESE PROBLEMS MADE IT FOR YOU TO DO YOUR WORK, TAKE CARE OF THINGS AT HOME, OR GET ALONG WITH OTHER PEOPLE: NOT DIFFICULT AT ALL
SUM OF ALL RESPONSES TO PHQ QUESTIONS 1-9: 0

## 2025-03-04 ASSESSMENT — LIFESTYLE VARIABLES: HOW OFTEN DO YOU HAVE A DRINK CONTAINING ALCOHOL: 2-4 TIMES A MONTH

## 2025-03-04 NOTE — PROGRESS NOTES
Medicare Annual Wellness Visit    Sara Wong is here for Medicare AWV       No follow-ups on file.     Subjective       Patient's complete Health Risk Assessment and screening values have been reviewed and are found in Flowsheets. The following problems were reviewed today and where indicated follow up appointments were made and/or referrals ordered.    No Positive Risk Factors identified today.                                    Objective   Vitals:    03/04/25 0954   BP: 104/64   Pulse: 76   Temp: 98.1 °F (36.7 °C)   TempSrc: Temporal   SpO2: 98%   Weight: 57.2 kg (126 lb)   Height: 1.651 m (5' 5\")      Body mass index is 20.97 kg/m².        General Appearance: alert and oriented to person, place and time, well developed and well- nourished, in no acute distress  Skin: warm and dry, no rash or erythema  Head: normocephalic and atraumatic  Eyes: pupils equal, round, and reactive to light, extraocular eye movements intact, conjunctivae normal  ENT: tympanic membrane, external ear and ear canal normal bilaterally, nose without deformity, nasal mucosa and turbinates normal without polyps  Neck: supple and non-tender without mass, no thyromegaly or thyroid nodules, no cervical lymphadenopathy  Pulmonary/Chest: clear to auscultation bilaterally- no wheezes, rales or rhonchi, normal air movement, no respiratory distress  Cardiovascular: normal rate, regular rhythm, normal S1 and S2, no murmurs, rubs, clicks, or gallops, distal pulses intact, no carotid bruits  Abdomen: soft, non-tender, non-distended, normal bowel sounds, no masses or organomegaly  Extremities: no cyanosis, clubbing or edema  Musculoskeletal: normal range of motion, no joint swelling, deformity or tenderness  Neurologic: reflexes normal and symmetric, no cranial nerve deficit, gait, coordination and speech normal            Allergies   Allergen Reactions    Sulfa Antibiotics Rash     Patient developed a rash and muscle pain.     Prior to Visit 
        No results found for: \"IRON\", \"TIBC\", \"FERRITIN\", \"FOLATE\", \"GASXSPDV12\", \"PTH\"                                                          BMP:    Lab Results   Component Value Date     02/22/2023    K 4.0 02/22/2023     02/22/2023    CO2 28 02/22/2023       LFT's:   Lab Results   Component Value Date    ALT 16 02/22/2023    AST 26 02/22/2023    ALKPHOS 74 02/22/2023    BILITOT 0.2 02/22/2023       Lipids:   Lab Results   Component Value Date    CHOL 213 (H) 09/05/2020    HDL 67 09/05/2020    TRIG 146 09/05/2020       INR: No results found for: \"INR\", \"PROTIME\"    U/A:No components found for: \"LABMICR\", \"[id number]\"     No results found for: \"LABA1C\"     Lab Results   Component Value Date    CREATININE 0.79 02/22/2023       -----------------------------------------------------------------     Assessment/Plan:   1. Multinodular goiter  This problem is stable continue to monitor per endocrinology    2. Hyperlipidemia, unspecified hyperlipidemia type  Problem is stable check above labs continue present med    3. Adenomatous polyp of colon, unspecified part of colon  Problem is stable followed by GI    4. Vitamin D deficiency  Check above lab    5. Depression, unspecified depression type  This problem is stable on present meds  Physical exam check screening labs up-to-date on her immunizations continue diet and exercise follow-up yearly if labs okay

## 2025-04-02 RX ORDER — SERTRALINE HYDROCHLORIDE 100 MG/1
TABLET, FILM COATED ORAL
Qty: 135 TABLET | Refills: 3 | Status: SHIPPED | OUTPATIENT
Start: 2025-04-02

## 2025-05-20 RX ORDER — ALENDRONATE SODIUM 70 MG/1
70 TABLET ORAL
Qty: 12 TABLET | Refills: 1 | Status: SHIPPED | OUTPATIENT
Start: 2025-05-20

## 2025-06-08 DIAGNOSIS — E78.5 HYPERLIPIDEMIA, UNSPECIFIED HYPERLIPIDEMIA TYPE: ICD-10-CM

## 2025-06-09 RX ORDER — ATORVASTATIN CALCIUM 40 MG/1
40 TABLET, FILM COATED ORAL DAILY
Qty: 90 TABLET | Refills: 1 | Status: SHIPPED | OUTPATIENT
Start: 2025-06-09